# Patient Record
Sex: MALE | Race: WHITE | NOT HISPANIC OR LATINO | Employment: UNEMPLOYED | URBAN - NONMETROPOLITAN AREA
[De-identification: names, ages, dates, MRNs, and addresses within clinical notes are randomized per-mention and may not be internally consistent; named-entity substitution may affect disease eponyms.]

---

## 2017-07-12 ENCOUNTER — APPOINTMENT (OUTPATIENT)
Dept: CT IMAGING | Facility: HOSPITAL | Age: 77
End: 2017-07-12

## 2017-07-12 ENCOUNTER — APPOINTMENT (OUTPATIENT)
Dept: GENERAL RADIOLOGY | Facility: HOSPITAL | Age: 77
End: 2017-07-12

## 2017-07-12 ENCOUNTER — HOSPITAL ENCOUNTER (INPATIENT)
Facility: HOSPITAL | Age: 77
LOS: 2 days | Discharge: ANOTHER HOSPITAL | End: 2017-07-14
Attending: EMERGENCY MEDICINE | Admitting: INTERNAL MEDICINE

## 2017-07-12 DIAGNOSIS — J18.9 PNEUMONIA OF BOTH LUNGS DUE TO INFECTIOUS ORGANISM, UNSPECIFIED PART OF LUNG: Primary | ICD-10-CM

## 2017-07-12 PROBLEM — M19.90 DJD (DEGENERATIVE JOINT DISEASE): Status: ACTIVE | Noted: 2017-07-12

## 2017-07-12 PROBLEM — I25.10 ASHD (ARTERIOSCLEROTIC HEART DISEASE): Status: ACTIVE | Noted: 2017-07-12

## 2017-07-12 PROBLEM — J44.9 COPD (CHRONIC OBSTRUCTIVE PULMONARY DISEASE) (HCC): Status: ACTIVE | Noted: 2017-07-12

## 2017-07-12 LAB
6-ACETYL MORPHINE: NEGATIVE
A-A DO2: 79.7 MMHG (ref 0–300)
ALBUMIN SERPL-MCNC: 4 G/DL (ref 3.4–4.8)
ALBUMIN/GLOB SERPL: 1.3 G/DL (ref 1.5–2.5)
ALP SERPL-CCNC: 144 U/L (ref 40–129)
ALT SERPL W P-5'-P-CCNC: 19 U/L (ref 10–44)
AMPHET+METHAMPHET UR QL: NEGATIVE
AMYLASE SERPL-CCNC: 46 U/L (ref 28–100)
ANION GAP SERPL CALCULATED.3IONS-SCNC: 9 MMOL/L (ref 3.6–11.2)
ANISOCYTOSIS BLD QL: ABNORMAL
ARTERIAL PATENCY WRIST A: POSITIVE
AST SERPL-CCNC: 40 U/L (ref 10–34)
ATMOSPHERIC PRESS: 729 MMHG
BACTERIA UR QL AUTO: ABNORMAL /HPF
BARBITURATES UR QL SCN: NEGATIVE
BASE EXCESS BLDA CALC-SCNC: -2 MMOL/L
BDY SITE: ABNORMAL
BENZODIAZ UR QL SCN: NEGATIVE
BILIRUB SERPL-MCNC: 1.5 MG/DL (ref 0.2–1.8)
BILIRUB UR QL STRIP: ABNORMAL
BNP SERPL-MCNC: 375 PG/ML (ref 0–100)
BODY TEMPERATURE: 98.6 C
BUN BLD-MCNC: 24 MG/DL (ref 7–21)
BUN/CREAT SERPL: 13.4 (ref 7–25)
BUPRENORPHINE SERPL-MCNC: NEGATIVE NG/ML
CALCIUM SPEC-SCNC: 8.8 MG/DL (ref 7.7–10)
CANNABINOIDS SERPL QL: NEGATIVE
CHLORIDE SERPL-SCNC: 104 MMOL/L (ref 99–112)
CK MB SERPL-CCNC: 0.55 NG/ML (ref 0–5)
CK MB SERPL-RTO: 1.2 % (ref 0–3)
CK SERPL-CCNC: 46 U/L (ref 24–204)
CLARITY UR: ABNORMAL
CO2 SERPL-SCNC: 22 MMOL/L (ref 24.3–31.9)
COCAINE UR QL: NEGATIVE
COHGB MFR BLD: 2.2 % (ref 0–5)
COLOR UR: ABNORMAL
CREAT BLD-MCNC: 1.79 MG/DL (ref 0.43–1.29)
CYTOLOGIST CVX/VAG CYTO: NORMAL
DEPRECATED RDW RBC AUTO: 50.4 FL (ref 37–54)
ERYTHROCYTE [DISTWIDTH] IN BLOOD BY AUTOMATED COUNT: 15.3 % (ref 11.5–14.5)
GFR SERPL CREATININE-BSD FRML MDRD: 37 ML/MIN/1.73
GLOBULIN UR ELPH-MCNC: 3 GM/DL
GLUCOSE BLD-MCNC: 136 MG/DL (ref 70–110)
GLUCOSE BLDC GLUCOMTR-MCNC: 107 MG/DL (ref 70–130)
GLUCOSE BLDC GLUCOMTR-MCNC: 108 MG/DL (ref 70–130)
GLUCOSE BLDC GLUCOMTR-MCNC: 128 MG/DL (ref 70–130)
GLUCOSE BLDC GLUCOMTR-MCNC: 152 MG/DL (ref 70–130)
GLUCOSE UR STRIP-MCNC: NEGATIVE MG/DL
GRAN CASTS URNS QL MICRO: ABNORMAL /LPF
HCO3 BLDA-SCNC: 20.7 MMOL/L (ref 22–26)
HCT VFR BLD AUTO: 37.6 % (ref 42–52)
HCT VFR BLD CALC: 39 % (ref 42–52)
HGB BLD-MCNC: 13 G/DL (ref 14–18)
HGB BLDA-MCNC: 13.3 G/DL (ref 12–16)
HGB UR QL STRIP.AUTO: NEGATIVE
HOROWITZ INDEX BLD+IHG-RTO: 28 %
HYALINE CASTS UR QL AUTO: ABNORMAL /LPF
KETONES UR QL STRIP: ABNORMAL
L PNEUMO1 AG UR QL IA: NEGATIVE
LDH SERPL-CCNC: 544 U/L (ref 135–225)
LEUKOCYTE ESTERASE UR QL STRIP.AUTO: ABNORMAL
LIPASE SERPL-CCNC: 31 U/L (ref 13–60)
LYMPHOCYTES # BLD MANUAL: 3.6 10*3/MM3 (ref 1–3)
LYMPHOCYTES NFR BLD MANUAL: 10 % (ref 16–46)
LYMPHOCYTES NFR BLD MANUAL: 44 % (ref 0–12)
M PNEUMO IGM SER QL: NEGATIVE
MCH RBC QN AUTO: 33.2 PG (ref 27–33)
MCHC RBC AUTO-ENTMCNC: 34.6 G/DL (ref 33–37)
MCV RBC AUTO: 96.2 FL (ref 80–94)
METAMYELOCYTES NFR BLD MANUAL: 5 % (ref 0–0)
METHADONE UR QL SCN: NEGATIVE
METHGB BLD QL: 0.3 % (ref 0–3)
MODALITY: ABNORMAL
MONOCYTES # BLD AUTO: 15.82 10*3/MM3 (ref 0.1–0.9)
MYELOCYTES NFR BLD MANUAL: 3 % (ref 0–0)
MYOGLOBIN SERPL-MCNC: 81 NG/ML (ref 0–109)
NEUTROPHILS # BLD AUTO: 13.66 10*3/MM3 (ref 1.4–6.5)
NEUTROPHILS NFR BLD MANUAL: 38 % (ref 40–75)
NEUTS BAND NFR BLD MANUAL: ABNORMAL % (ref 0–8)
NITRITE UR QL STRIP: NEGATIVE
OPIATES UR QL: NEGATIVE
OSMOLALITY SERPL CALC.SUM OF ELEC: 276.2 MOSM/KG (ref 273–305)
OXYCODONE UR QL SCN: NEGATIVE
OXYHGB MFR BLDV: 93.2 % (ref 85–100)
PATH INTERP BLD-IMP: NORMAL
PCO2 BLDA: 29.8 MM HG (ref 35–45)
PCP UR QL SCN: NEGATIVE
PH BLDA: 7.46 PH UNITS (ref 7.35–7.45)
PH UR STRIP.AUTO: <=5 [PH] (ref 5–8)
PLATELET # BLD AUTO: 70 10*3/MM3 (ref 130–400)
PMV BLD AUTO: 13.3 FL (ref 6–10)
PO2 BLDA: 76.1 MM HG (ref 80–100)
POTASSIUM BLD-SCNC: 4 MMOL/L (ref 3.5–5.3)
PROMYELOCYTES NFR BLD MANUAL: ABNORMAL % (ref 0–0)
PROT SERPL-MCNC: 7 G/DL (ref 6–8)
PROT UR QL STRIP: ABNORMAL
RBC # BLD AUTO: 3.91 10*6/MM3 (ref 4.7–6.1)
RBC # UR: ABNORMAL /HPF
REF LAB TEST METHOD: ABNORMAL
SAO2 % BLDCOA: 95.6 % (ref 90–100)
SCAN SLIDE: NORMAL
SMALL PLATELETS BLD QL SMEAR: ABNORMAL
SODIUM BLD-SCNC: 135 MMOL/L (ref 135–153)
SP GR UR STRIP: >=1.03 (ref 1–1.03)
SQUAMOUS #/AREA URNS HPF: ABNORMAL /HPF
TROPONIN I SERPL-MCNC: <0.006 NG/ML
TROPONIN I SERPL-MCNC: <0.006 NG/ML
URATE SERPL-MCNC: 7.6 MG/DL (ref 3.7–7)
UROBILINOGEN UR QL STRIP: ABNORMAL
WBC NRBC COR # BLD: 35.95 10*3/MM3 (ref 4.5–12.5)
WBC UR QL AUTO: ABNORMAL /HPF

## 2017-07-12 PROCEDURE — 87205 SMEAR GRAM STAIN: CPT | Performed by: INTERNAL MEDICINE

## 2017-07-12 PROCEDURE — 82805 BLOOD GASES W/O2 SATURATION: CPT | Performed by: PHYSICIAN ASSISTANT

## 2017-07-12 PROCEDURE — 25010000002 VANCOMYCIN PER 500 MG

## 2017-07-12 PROCEDURE — 70450 CT HEAD/BRAIN W/O DYE: CPT | Performed by: RADIOLOGY

## 2017-07-12 PROCEDURE — 71250 CT THORAX DX C-: CPT

## 2017-07-12 PROCEDURE — 80307 DRUG TEST PRSMV CHEM ANLYZR: CPT | Performed by: PHYSICIAN ASSISTANT

## 2017-07-12 PROCEDURE — 25010000002 ONDANSETRON PER 1 MG: Performed by: PHYSICIAN ASSISTANT

## 2017-07-12 PROCEDURE — 83615 LACTATE (LD) (LDH) ENZYME: CPT | Performed by: INTERNAL MEDICINE

## 2017-07-12 PROCEDURE — 94799 UNLISTED PULMONARY SVC/PX: CPT

## 2017-07-12 PROCEDURE — 36600 WITHDRAWAL OF ARTERIAL BLOOD: CPT | Performed by: PHYSICIAN ASSISTANT

## 2017-07-12 PROCEDURE — 25010000002 AZITHROMYCIN: Performed by: PHYSICIAN ASSISTANT

## 2017-07-12 PROCEDURE — 94640 AIRWAY INHALATION TREATMENT: CPT

## 2017-07-12 PROCEDURE — 80053 COMPREHEN METABOLIC PANEL: CPT | Performed by: PHYSICIAN ASSISTANT

## 2017-07-12 PROCEDURE — 84484 ASSAY OF TROPONIN QUANT: CPT | Performed by: PHYSICIAN ASSISTANT

## 2017-07-12 PROCEDURE — 83690 ASSAY OF LIPASE: CPT | Performed by: PHYSICIAN ASSISTANT

## 2017-07-12 PROCEDURE — 82550 ASSAY OF CK (CPK): CPT | Performed by: PHYSICIAN ASSISTANT

## 2017-07-12 PROCEDURE — 63710000001 INSULIN ASPART PER 5 UNITS: Performed by: INTERNAL MEDICINE

## 2017-07-12 PROCEDURE — 85060 BLOOD SMEAR INTERPRETATION: CPT | Performed by: PHYSICIAN ASSISTANT

## 2017-07-12 PROCEDURE — 83050 HGB METHEMOGLOBIN QUAN: CPT | Performed by: PHYSICIAN ASSISTANT

## 2017-07-12 PROCEDURE — 87040 BLOOD CULTURE FOR BACTERIA: CPT | Performed by: PHYSICIAN ASSISTANT

## 2017-07-12 PROCEDURE — 85025 COMPLETE CBC W/AUTO DIFF WBC: CPT | Performed by: PHYSICIAN ASSISTANT

## 2017-07-12 PROCEDURE — 71010 HC CHEST AP: CPT

## 2017-07-12 PROCEDURE — 84550 ASSAY OF BLOOD/URIC ACID: CPT | Performed by: INTERNAL MEDICINE

## 2017-07-12 PROCEDURE — 85060 BLOOD SMEAR INTERPRETATION: CPT | Performed by: INTERNAL MEDICINE

## 2017-07-12 PROCEDURE — 93010 ELECTROCARDIOGRAM REPORT: CPT | Performed by: INTERNAL MEDICINE

## 2017-07-12 PROCEDURE — 87070 CULTURE OTHR SPECIMN AEROBIC: CPT | Performed by: INTERNAL MEDICINE

## 2017-07-12 PROCEDURE — 87040 BLOOD CULTURE FOR BACTERIA: CPT | Performed by: INTERNAL MEDICINE

## 2017-07-12 PROCEDURE — 99285 EMERGENCY DEPT VISIT HI MDM: CPT

## 2017-07-12 PROCEDURE — 99223 1ST HOSP IP/OBS HIGH 75: CPT | Performed by: INTERNAL MEDICINE

## 2017-07-12 PROCEDURE — 82962 GLUCOSE BLOOD TEST: CPT

## 2017-07-12 PROCEDURE — 81001 URINALYSIS AUTO W/SCOPE: CPT | Performed by: PHYSICIAN ASSISTANT

## 2017-07-12 PROCEDURE — 82150 ASSAY OF AMYLASE: CPT | Performed by: PHYSICIAN ASSISTANT

## 2017-07-12 PROCEDURE — 87449 NOS EACH ORGANISM AG IA: CPT | Performed by: INTERNAL MEDICINE

## 2017-07-12 PROCEDURE — 86757 RICKETTSIA ANTIBODY: CPT | Performed by: INTERNAL MEDICINE

## 2017-07-12 PROCEDURE — 93005 ELECTROCARDIOGRAM TRACING: CPT | Performed by: PHYSICIAN ASSISTANT

## 2017-07-12 PROCEDURE — 93005 ELECTROCARDIOGRAM TRACING: CPT | Performed by: INTERNAL MEDICINE

## 2017-07-12 PROCEDURE — 36415 COLL VENOUS BLD VENIPUNCTURE: CPT

## 2017-07-12 PROCEDURE — 71250 CT THORAX DX C-: CPT | Performed by: RADIOLOGY

## 2017-07-12 PROCEDURE — 87086 URINE CULTURE/COLONY COUNT: CPT | Performed by: PHYSICIAN ASSISTANT

## 2017-07-12 PROCEDURE — 85007 BL SMEAR W/DIFF WBC COUNT: CPT | Performed by: PHYSICIAN ASSISTANT

## 2017-07-12 PROCEDURE — 83880 ASSAY OF NATRIURETIC PEPTIDE: CPT | Performed by: PHYSICIAN ASSISTANT

## 2017-07-12 PROCEDURE — 70450 CT HEAD/BRAIN W/O DYE: CPT

## 2017-07-12 PROCEDURE — 83874 ASSAY OF MYOGLOBIN: CPT | Performed by: PHYSICIAN ASSISTANT

## 2017-07-12 PROCEDURE — 71010 XR CHEST AP: CPT | Performed by: RADIOLOGY

## 2017-07-12 PROCEDURE — 82553 CREATINE MB FRACTION: CPT | Performed by: PHYSICIAN ASSISTANT

## 2017-07-12 PROCEDURE — 25010000002 PIPERACILLIN-TAZOBACTAM: Performed by: INTERNAL MEDICINE

## 2017-07-12 PROCEDURE — 82375 ASSAY CARBOXYHB QUANT: CPT | Performed by: PHYSICIAN ASSISTANT

## 2017-07-12 PROCEDURE — 25010000002 ENOXAPARIN PER 10 MG: Performed by: INTERNAL MEDICINE

## 2017-07-12 RX ORDER — ATORVASTATIN CALCIUM 10 MG/1
10 TABLET, FILM COATED ORAL DAILY
COMMUNITY

## 2017-07-12 RX ORDER — FAMOTIDINE 20 MG/1
20 TABLET, FILM COATED ORAL DAILY
Status: DISCONTINUED | OUTPATIENT
Start: 2017-07-12 | End: 2017-07-14 | Stop reason: HOSPADM

## 2017-07-12 RX ORDER — CEFPODOXIME PROXETIL 200 MG/1
200 TABLET, FILM COATED ORAL EVERY 12 HOURS
COMMUNITY
End: 2017-07-12

## 2017-07-12 RX ORDER — CLOPIDOGREL BISULFATE 75 MG/1
75 TABLET ORAL DAILY
Status: CANCELLED | OUTPATIENT
Start: 2017-07-12

## 2017-07-12 RX ORDER — FLUOXETINE HYDROCHLORIDE 20 MG/1
20 CAPSULE ORAL DAILY
COMMUNITY

## 2017-07-12 RX ORDER — CARVEDILOL 6.25 MG/1
12.5 TABLET ORAL 2 TIMES DAILY WITH MEALS
Status: CANCELLED | OUTPATIENT
Start: 2017-07-12

## 2017-07-12 RX ORDER — SODIUM CHLORIDE 450 MG/100ML
75 INJECTION, SOLUTION INTRAVENOUS CONTINUOUS
Status: DISCONTINUED | OUTPATIENT
Start: 2017-07-12 | End: 2017-07-14

## 2017-07-12 RX ORDER — FLUOXETINE HYDROCHLORIDE 20 MG/1
20 CAPSULE ORAL DAILY
Status: DISCONTINUED | OUTPATIENT
Start: 2017-07-12 | End: 2017-07-14 | Stop reason: HOSPADM

## 2017-07-12 RX ORDER — ONDANSETRON 2 MG/ML
4 INJECTION INTRAMUSCULAR; INTRAVENOUS EVERY 6 HOURS PRN
Status: DISCONTINUED | OUTPATIENT
Start: 2017-07-12 | End: 2017-07-14 | Stop reason: HOSPADM

## 2017-07-12 RX ORDER — IPRATROPIUM BROMIDE AND ALBUTEROL SULFATE 2.5; .5 MG/3ML; MG/3ML
3 SOLUTION RESPIRATORY (INHALATION)
Status: DISCONTINUED | OUTPATIENT
Start: 2017-07-12 | End: 2017-07-14 | Stop reason: HOSPADM

## 2017-07-12 RX ORDER — ASPIRIN 81 MG/1
81 TABLET, CHEWABLE ORAL DAILY
Status: DISCONTINUED | OUTPATIENT
Start: 2017-07-12 | End: 2017-07-14

## 2017-07-12 RX ORDER — ALLOPURINOL 300 MG/1
300 TABLET ORAL 2 TIMES DAILY
Status: DISCONTINUED | OUTPATIENT
Start: 2017-07-12 | End: 2017-07-14 | Stop reason: HOSPADM

## 2017-07-12 RX ORDER — LORAZEPAM 0.5 MG/1
0.5 TABLET ORAL ONCE
Status: COMPLETED | OUTPATIENT
Start: 2017-07-13 | End: 2017-07-13

## 2017-07-12 RX ORDER — ASPIRIN 81 MG/1
81 TABLET, CHEWABLE ORAL NIGHTLY
COMMUNITY
End: 2017-07-14 | Stop reason: HOSPADM

## 2017-07-12 RX ORDER — FAMOTIDINE 20 MG/1
20 TABLET, FILM COATED ORAL DAILY
Status: CANCELLED | OUTPATIENT
Start: 2017-07-12

## 2017-07-12 RX ORDER — IPRATROPIUM BROMIDE AND ALBUTEROL SULFATE 2.5; .5 MG/3ML; MG/3ML
3 SOLUTION RESPIRATORY (INHALATION)
Status: DISCONTINUED | OUTPATIENT
Start: 2017-07-12 | End: 2017-07-12

## 2017-07-12 RX ORDER — ATORVASTATIN CALCIUM 10 MG/1
10 TABLET, FILM COATED ORAL NIGHTLY
Status: DISCONTINUED | OUTPATIENT
Start: 2017-07-12 | End: 2017-07-14 | Stop reason: HOSPADM

## 2017-07-12 RX ORDER — IPRATROPIUM BROMIDE AND ALBUTEROL SULFATE 2.5; .5 MG/3ML; MG/3ML
3 SOLUTION RESPIRATORY (INHALATION) EVERY 4 HOURS PRN
COMMUNITY

## 2017-07-12 RX ORDER — FLUOXETINE HYDROCHLORIDE 20 MG/1
20 CAPSULE ORAL DAILY
Status: CANCELLED | OUTPATIENT
Start: 2017-07-12

## 2017-07-12 RX ORDER — ATORVASTATIN CALCIUM 10 MG/1
10 TABLET, FILM COATED ORAL DAILY
Status: CANCELLED | OUTPATIENT
Start: 2017-07-12

## 2017-07-12 RX ORDER — ONDANSETRON 2 MG/ML
4 INJECTION INTRAMUSCULAR; INTRAVENOUS ONCE
Status: COMPLETED | OUTPATIENT
Start: 2017-07-12 | End: 2017-07-12

## 2017-07-12 RX ORDER — UREA 10 %
1 LOTION (ML) TOPICAL DAILY
Status: DISCONTINUED | OUTPATIENT
Start: 2017-07-12 | End: 2017-07-14 | Stop reason: HOSPADM

## 2017-07-12 RX ORDER — SODIUM CHLORIDE 0.9 % (FLUSH) 0.9 %
10 SYRINGE (ML) INJECTION AS NEEDED
Status: DISCONTINUED | OUTPATIENT
Start: 2017-07-12 | End: 2017-07-14 | Stop reason: HOSPADM

## 2017-07-12 RX ORDER — ASPIRIN 81 MG/1
81 TABLET, CHEWABLE ORAL NIGHTLY
Status: CANCELLED | OUTPATIENT
Start: 2017-07-12

## 2017-07-12 RX ORDER — DEXTROSE MONOHYDRATE 25 G/50ML
25 INJECTION, SOLUTION INTRAVENOUS
Status: DISCONTINUED | OUTPATIENT
Start: 2017-07-12 | End: 2017-07-14 | Stop reason: HOSPADM

## 2017-07-12 RX ORDER — NICOTINE POLACRILEX 4 MG
15 LOZENGE BUCCAL
Status: DISCONTINUED | OUTPATIENT
Start: 2017-07-12 | End: 2017-07-14 | Stop reason: HOSPADM

## 2017-07-12 RX ORDER — CARVEDILOL 25 MG/1
12.5 TABLET ORAL 2 TIMES DAILY WITH MEALS
COMMUNITY
End: 2017-07-14 | Stop reason: HOSPADM

## 2017-07-12 RX ORDER — CLOPIDOGREL BISULFATE 75 MG/1
75 TABLET ORAL DAILY
Status: DISCONTINUED | OUTPATIENT
Start: 2017-07-12 | End: 2017-07-14

## 2017-07-12 RX ORDER — IPRATROPIUM BROMIDE AND ALBUTEROL SULFATE 2.5; .5 MG/3ML; MG/3ML
3 SOLUTION RESPIRATORY (INHALATION) EVERY 4 HOURS PRN
Status: DISCONTINUED | OUTPATIENT
Start: 2017-07-12 | End: 2017-07-14 | Stop reason: HOSPADM

## 2017-07-12 RX ORDER — CLOPIDOGREL BISULFATE 75 MG/1
75 TABLET ORAL DAILY
COMMUNITY
End: 2017-07-14 | Stop reason: HOSPADM

## 2017-07-12 RX ORDER — LORAZEPAM 0.5 MG/1
0.5 TABLET ORAL ONCE
Status: COMPLETED | OUTPATIENT
Start: 2017-07-12 | End: 2017-07-12

## 2017-07-12 RX ORDER — RANITIDINE 150 MG/1
150 TABLET ORAL DAILY
COMMUNITY
End: 2017-07-14 | Stop reason: HOSPADM

## 2017-07-12 RX ORDER — FAMOTIDINE 20 MG/1
20 TABLET, FILM COATED ORAL 2 TIMES DAILY
Status: DISCONTINUED | OUTPATIENT
Start: 2017-07-12 | End: 2017-07-12

## 2017-07-12 RX ORDER — CARVEDILOL 6.25 MG/1
12.5 TABLET ORAL EVERY 12 HOURS SCHEDULED
Status: DISCONTINUED | OUTPATIENT
Start: 2017-07-12 | End: 2017-07-14 | Stop reason: HOSPADM

## 2017-07-12 RX ORDER — ACETAMINOPHEN 325 MG/1
650 TABLET ORAL EVERY 6 HOURS PRN
Status: DISCONTINUED | OUTPATIENT
Start: 2017-07-12 | End: 2017-07-14 | Stop reason: HOSPADM

## 2017-07-12 RX ADMIN — PIPERACILLIN SODIUM,TAZOBACTAM SODIUM 3.38 G: 3; .375 INJECTION, POWDER, FOR SOLUTION INTRAVENOUS at 08:26

## 2017-07-12 RX ADMIN — FLUOXETINE 20 MG: 20 CAPSULE ORAL at 08:27

## 2017-07-12 RX ADMIN — VANCOMYCIN HYDROCHLORIDE 1750 MG: 5 INJECTION, POWDER, LYOPHILIZED, FOR SOLUTION INTRAVENOUS at 11:38

## 2017-07-12 RX ADMIN — Medication 1 TABLET: at 08:27

## 2017-07-12 RX ADMIN — ACETAMINOPHEN 650 MG: 325 TABLET ORAL at 19:48

## 2017-07-12 RX ADMIN — PIPERACILLIN SODIUM,TAZOBACTAM SODIUM 3.38 G: 3; .375 INJECTION, POWDER, FOR SOLUTION INTRAVENOUS at 17:03

## 2017-07-12 RX ADMIN — ENOXAPARIN SODIUM 30 MG: 30 INJECTION SUBCUTANEOUS at 08:26

## 2017-07-12 RX ADMIN — FAMOTIDINE 20 MG: 20 TABLET ORAL at 08:27

## 2017-07-12 RX ADMIN — INSULIN ASPART 2 UNITS: 100 INJECTION, SOLUTION INTRAVENOUS; SUBCUTANEOUS at 11:38

## 2017-07-12 RX ADMIN — AZITHROMYCIN 500 MG: 500 INJECTION, POWDER, LYOPHILIZED, FOR SOLUTION INTRAVENOUS at 05:10

## 2017-07-12 RX ADMIN — ASPIRIN 81 MG: 81 TABLET, CHEWABLE ORAL at 08:26

## 2017-07-12 RX ADMIN — CLOPIDOGREL BISULFATE 75 MG: 75 TABLET, FILM COATED ORAL at 08:26

## 2017-07-12 RX ADMIN — CARVEDILOL 12.5 MG: 6.25 TABLET, FILM COATED ORAL at 19:48

## 2017-07-12 RX ADMIN — SODIUM CHLORIDE 75 ML/HR: 4.5 INJECTION, SOLUTION INTRAVENOUS at 08:26

## 2017-07-12 RX ADMIN — SODIUM CHLORIDE 500 ML: 9 INJECTION, SOLUTION INTRAVENOUS at 04:12

## 2017-07-12 RX ADMIN — LORAZEPAM 0.5 MG: 0.5 TABLET ORAL at 17:03

## 2017-07-12 RX ADMIN — ONDANSETRON 4 MG: 2 INJECTION, SOLUTION INTRAMUSCULAR; INTRAVENOUS at 05:41

## 2017-07-12 RX ADMIN — ATORVASTATIN CALCIUM 10 MG: 10 TABLET, FILM COATED ORAL at 19:48

## 2017-07-12 RX ADMIN — PIPERACILLIN SODIUM,TAZOBACTAM SODIUM 3.38 G: 3; .375 INJECTION, POWDER, FOR SOLUTION INTRAVENOUS at 19:49

## 2017-07-12 RX ADMIN — IPRATROPIUM BROMIDE AND ALBUTEROL SULFATE 3 ML: .5; 3 SOLUTION RESPIRATORY (INHALATION) at 14:18

## 2017-07-12 RX ADMIN — ALLOPURINOL 300 MG: 300 TABLET ORAL at 17:19

## 2017-07-12 RX ADMIN — IPRATROPIUM BROMIDE AND ALBUTEROL SULFATE 3 ML: .5; 3 SOLUTION RESPIRATORY (INHALATION) at 18:57

## 2017-07-13 ENCOUNTER — APPOINTMENT (OUTPATIENT)
Dept: GENERAL RADIOLOGY | Facility: HOSPITAL | Age: 77
End: 2017-07-13

## 2017-07-13 ENCOUNTER — APPOINTMENT (OUTPATIENT)
Dept: CT IMAGING | Facility: HOSPITAL | Age: 77
End: 2017-07-13

## 2017-07-13 PROBLEM — D72.821 MONOCYTOSIS: Status: ACTIVE | Noted: 2017-07-13

## 2017-07-13 PROBLEM — D69.6 THROMBOCYTOPENIA (HCC): Status: ACTIVE | Noted: 2017-07-13

## 2017-07-13 PROBLEM — D64.9 NORMOCYTIC ANEMIA: Status: ACTIVE | Noted: 2017-07-13

## 2017-07-13 LAB
A-A DO2: 169.5 MMHG (ref 0–300)
A-A DO2: 219.3 MMHG (ref 0–300)
ALBUMIN SERPL-MCNC: 3.3 G/DL (ref 3.4–4.8)
ALBUMIN/GLOB SERPL: 1.3 G/DL (ref 1.5–2.5)
ALP SERPL-CCNC: 112 U/L (ref 40–129)
ALT SERPL W P-5'-P-CCNC: 13 U/L (ref 10–44)
ANION GAP SERPL CALCULATED.3IONS-SCNC: 9.6 MMOL/L (ref 3.6–11.2)
ANISOCYTOSIS BLD QL: ABNORMAL
ANISOCYTOSIS BLD QL: ABNORMAL
ARTERIAL PATENCY WRIST A: POSITIVE
ARTERIAL PATENCY WRIST A: POSITIVE
AST SERPL-CCNC: 32 U/L (ref 10–34)
ATMOSPHERIC PRESS: 731 MMHG
ATMOSPHERIC PRESS: 731 MMHG
BASE EXCESS BLDA CALC-SCNC: -3.8 MMOL/L
BASE EXCESS BLDA CALC-SCNC: -4.6 MMOL/L
BDY SITE: ABNORMAL
BDY SITE: ABNORMAL
BILIRUB SERPL-MCNC: 1.9 MG/DL (ref 0.2–1.8)
BODY TEMPERATURE: 98.6 C
BODY TEMPERATURE: 98.6 C
BUN BLD-MCNC: 26 MG/DL (ref 7–21)
BUN/CREAT SERPL: 16.3 (ref 7–25)
CALCIUM SPEC-SCNC: 8.4 MG/DL (ref 7.7–10)
CHLORIDE SERPL-SCNC: 106 MMOL/L (ref 99–112)
CO2 SERPL-SCNC: 21.4 MMOL/L (ref 24.3–31.9)
COHGB MFR BLD: 1.6 % (ref 0–5)
COHGB MFR BLD: 2.2 % (ref 0–5)
CREAT BLD-MCNC: 1.6 MG/DL (ref 0.43–1.29)
DEPRECATED RDW RBC AUTO: 52.1 FL (ref 37–54)
DEPRECATED RDW RBC AUTO: 52.8 FL (ref 37–54)
EPAP: 7
ERYTHROCYTE [DISTWIDTH] IN BLOOD BY AUTOMATED COUNT: 15.5 % (ref 11.5–14.5)
ERYTHROCYTE [DISTWIDTH] IN BLOOD BY AUTOMATED COUNT: 15.9 % (ref 11.5–14.5)
FERRITIN SERPL-MCNC: 1018 NG/ML (ref 21.9–321.7)
FOLATE SERPL-MCNC: >24 NG/ML (ref 5.4–20)
GFR SERPL CREATININE-BSD FRML MDRD: 42 ML/MIN/1.73
GLOBULIN UR ELPH-MCNC: 2.6 GM/DL
GLUCOSE BLD-MCNC: 126 MG/DL (ref 70–110)
GLUCOSE BLDC GLUCOMTR-MCNC: 103 MG/DL (ref 70–130)
GLUCOSE BLDC GLUCOMTR-MCNC: 110 MG/DL (ref 70–130)
GLUCOSE BLDC GLUCOMTR-MCNC: 116 MG/DL (ref 70–130)
GLUCOSE BLDC GLUCOMTR-MCNC: 99 MG/DL (ref 70–130)
HAV IGM SERPL QL IA: NORMAL
HBV CORE IGM SERPL QL IA: NORMAL
HBV SURFACE AG SERPL QL IA: NORMAL
HCO3 BLDA-SCNC: 18.2 MMOL/L (ref 22–26)
HCO3 BLDA-SCNC: 19.2 MMOL/L (ref 22–26)
HCT VFR BLD AUTO: 30.3 % (ref 42–52)
HCT VFR BLD AUTO: 32.3 % (ref 42–52)
HCT VFR BLD CALC: 34 % (ref 42–52)
HCT VFR BLD CALC: 36 % (ref 42–52)
HCV AB SER DONR QL: NORMAL
HGB BLD-MCNC: 10.4 G/DL (ref 14–18)
HGB BLD-MCNC: 10.9 G/DL (ref 14–18)
HGB BLDA-MCNC: 11.6 G/DL (ref 12–16)
HGB BLDA-MCNC: 12.2 G/DL (ref 12–16)
HIV1+2 AB SER QL: NORMAL
HOROWITZ INDEX BLD+IHG-RTO: 45 %
HOROWITZ INDEX BLD+IHG-RTO: 50 %
IPAP: 14
IRON 24H UR-MRATE: 37 MCG/DL (ref 53–167)
IRON SATN MFR SERPL: 17 % (ref 20–50)
LDH SERPL-CCNC: 492 U/L (ref 135–225)
LYMPHOCYTES # BLD MANUAL: 3.01 10*3/MM3 (ref 1–3)
LYMPHOCYTES # BLD MANUAL: 3.82 10*3/MM3 (ref 1–3)
LYMPHOCYTES NFR BLD MANUAL: 13 % (ref 16–46)
LYMPHOCYTES NFR BLD MANUAL: 46 % (ref 0–12)
LYMPHOCYTES NFR BLD MANUAL: 50 % (ref 0–12)
LYMPHOCYTES NFR BLD MANUAL: 8 % (ref 16–46)
MCH RBC QN AUTO: 32.3 PG (ref 27–33)
MCH RBC QN AUTO: 32.4 PG (ref 27–33)
MCHC RBC AUTO-ENTMCNC: 33.7 G/DL (ref 33–37)
MCHC RBC AUTO-ENTMCNC: 34.3 G/DL (ref 33–37)
MCV RBC AUTO: 94.1 FL (ref 80–94)
MCV RBC AUTO: 96.1 FL (ref 80–94)
METAMYELOCYTES NFR BLD MANUAL: 1 % (ref 0–0)
METAMYELOCYTES NFR BLD MANUAL: 3 % (ref 0–0)
METHGB BLD QL: 0.4 % (ref 0–3)
METHGB BLD QL: 0.4 % (ref 0–3)
MODALITY: ABNORMAL
MODALITY: ABNORMAL
MONOCYTES # BLD AUTO: 13.51 10*3/MM3 (ref 0.1–0.9)
MONOCYTES # BLD AUTO: 18.83 10*3/MM3 (ref 0.1–0.9)
MYELOCYTES NFR BLD MANUAL: 2 % (ref 0–0)
MYELOCYTES NFR BLD MANUAL: 2 % (ref 0–0)
NEUTROPHILS # BLD AUTO: 11.16 10*3/MM3 (ref 1.4–6.5)
NEUTROPHILS # BLD AUTO: 13.93 10*3/MM3 (ref 1.4–6.5)
NEUTROPHILS NFR BLD MANUAL: 37 % (ref 40–75)
NEUTROPHILS NFR BLD MANUAL: 38 % (ref 40–75)
OSMOLALITY SERPL CALC.SUM OF ELEC: 280.1 MOSM/KG (ref 273–305)
OXYHGB MFR BLDV: 95.2 % (ref 85–100)
OXYHGB MFR BLDV: 95.9 % (ref 85–100)
PCO2 BLDA: 27.2 MM HG (ref 35–45)
PCO2 BLDA: 28.6 MM HG (ref 35–45)
PH BLDA: 7.44 PH UNITS (ref 7.35–7.45)
PH BLDA: 7.44 PH UNITS (ref 7.35–7.45)
PLAT MORPH BLD: NORMAL
PLATELET # BLD AUTO: 48 10*3/MM3 (ref 130–400)
PLATELET # BLD AUTO: 50 10*3/MM3 (ref 130–400)
PMV BLD AUTO: 13.2 FL (ref 6–10)
PMV BLD AUTO: ABNORMAL FL (ref 6–10)
PO2 BLDA: 107.4 MM HG (ref 80–100)
PO2 BLDA: 90.5 MM HG (ref 80–100)
POTASSIUM BLD-SCNC: 3.8 MMOL/L (ref 3.5–5.3)
PROT SERPL-MCNC: 5.9 G/DL (ref 6–8)
RBC # BLD AUTO: 3.22 10*6/MM3 (ref 4.7–6.1)
RBC # BLD AUTO: 3.36 10*6/MM3 (ref 4.7–6.1)
RETICS #: 0.08 10*6/MM3 (ref 0.02–0.13)
RETICS/RBC NFR AUTO: 2.38 % (ref 0.5–2)
SAO2 % BLDCOA: 97.1 % (ref 90–100)
SAO2 % BLDCOA: 98.5 % (ref 90–100)
SCAN SLIDE: NORMAL
SET MECH RESP RATE: 16
SMALL PLATELETS BLD QL SMEAR: ABNORMAL
SODIUM BLD-SCNC: 137 MMOL/L (ref 135–153)
TIBC SERPL-MCNC: 219 MCG/DL (ref 241–421)
URATE SERPL-MCNC: 7 MG/DL (ref 3.7–7)
VIT B12 BLD-MCNC: 1282 PG/ML (ref 211–911)
WBC NRBC COR # BLD: 29.37 10*3/MM3 (ref 4.5–12.5)
WBC NRBC COR # BLD: 37.65 10*3/MM3 (ref 4.5–12.5)

## 2017-07-13 PROCEDURE — 87205 SMEAR GRAM STAIN: CPT | Performed by: INTERNAL MEDICINE

## 2017-07-13 PROCEDURE — 81206 BCR/ABL1 GENE MAJOR BP: CPT | Performed by: INTERNAL MEDICINE

## 2017-07-13 PROCEDURE — 82805 BLOOD GASES W/O2 SATURATION: CPT | Performed by: INTERNAL MEDICINE

## 2017-07-13 PROCEDURE — 83550 IRON BINDING TEST: CPT | Performed by: INTERNAL MEDICINE

## 2017-07-13 PROCEDURE — 82525 ASSAY OF COPPER: CPT | Performed by: INTERNAL MEDICINE

## 2017-07-13 PROCEDURE — 82746 ASSAY OF FOLIC ACID SERUM: CPT | Performed by: INTERNAL MEDICINE

## 2017-07-13 PROCEDURE — 80074 ACUTE HEPATITIS PANEL: CPT | Performed by: INTERNAL MEDICINE

## 2017-07-13 PROCEDURE — 38221 DX BONE MARROW BIOPSIES: CPT | Performed by: INTERNAL MEDICINE

## 2017-07-13 PROCEDURE — 85045 AUTOMATED RETICULOCYTE COUNT: CPT | Performed by: INTERNAL MEDICINE

## 2017-07-13 PROCEDURE — 94799 UNLISTED PULMONARY SVC/PX: CPT

## 2017-07-13 PROCEDURE — 80053 COMPREHEN METABOLIC PANEL: CPT | Performed by: INTERNAL MEDICINE

## 2017-07-13 PROCEDURE — 82607 VITAMIN B-12: CPT | Performed by: INTERNAL MEDICINE

## 2017-07-13 PROCEDURE — 5A1945Z RESPIRATORY VENTILATION, 24-96 CONSECUTIVE HOURS: ICD-10-PCS | Performed by: EMERGENCY MEDICINE

## 2017-07-13 PROCEDURE — 25010000002 VANCOMYCIN PER 500 MG

## 2017-07-13 PROCEDURE — 83540 ASSAY OF IRON: CPT | Performed by: INTERNAL MEDICINE

## 2017-07-13 PROCEDURE — 25010000002 ENOXAPARIN PER 10 MG: Performed by: INTERNAL MEDICINE

## 2017-07-13 PROCEDURE — 84550 ASSAY OF BLOOD/URIC ACID: CPT | Performed by: INTERNAL MEDICINE

## 2017-07-13 PROCEDURE — 83615 LACTATE (LD) (LDH) ENZYME: CPT | Performed by: INTERNAL MEDICINE

## 2017-07-13 PROCEDURE — 82962 GLUCOSE BLOOD TEST: CPT

## 2017-07-13 PROCEDURE — 36600 WITHDRAWAL OF ARTERIAL BLOOD: CPT | Performed by: INTERNAL MEDICINE

## 2017-07-13 PROCEDURE — 70450 CT HEAD/BRAIN W/O DYE: CPT | Performed by: RADIOLOGY

## 2017-07-13 PROCEDURE — 82375 ASSAY CARBOXYHB QUANT: CPT | Performed by: INTERNAL MEDICINE

## 2017-07-13 PROCEDURE — 71010 HC CHEST PA OR AP: CPT

## 2017-07-13 PROCEDURE — 25010000002 HALOPERIDOL LACTATE PER 5 MG: Performed by: INTERNAL MEDICINE

## 2017-07-13 PROCEDURE — 70450 CT HEAD/BRAIN W/O DYE: CPT

## 2017-07-13 PROCEDURE — 83050 HGB METHEMOGLOBIN QUAN: CPT | Performed by: INTERNAL MEDICINE

## 2017-07-13 PROCEDURE — 87040 BLOOD CULTURE FOR BACTERIA: CPT | Performed by: INTERNAL MEDICINE

## 2017-07-13 PROCEDURE — 31500 INSERT EMERGENCY AIRWAY: CPT | Performed by: EMERGENCY MEDICINE

## 2017-07-13 PROCEDURE — 85097 BONE MARROW INTERPRETATION: CPT

## 2017-07-13 PROCEDURE — 82668 ASSAY OF ERYTHROPOIETIN: CPT | Performed by: INTERNAL MEDICINE

## 2017-07-13 PROCEDURE — 25010000002 PIPERACILLIN-TAZOBACTAM: Performed by: INTERNAL MEDICINE

## 2017-07-13 PROCEDURE — 94002 VENT MGMT INPAT INIT DAY: CPT

## 2017-07-13 PROCEDURE — 88313 SPECIAL STAINS GROUP 2: CPT

## 2017-07-13 PROCEDURE — 25010000002 LORAZEPAM PER 2 MG

## 2017-07-13 PROCEDURE — 31500 INSERT EMERGENCY AIRWAY: CPT

## 2017-07-13 PROCEDURE — 0BH17EZ INSERTION OF ENDOTRACHEAL AIRWAY INTO TRACHEA, VIA NATURAL OR ARTIFICIAL OPENING: ICD-10-PCS | Performed by: EMERGENCY MEDICINE

## 2017-07-13 PROCEDURE — 71010 HC CHEST AP: CPT

## 2017-07-13 PROCEDURE — 88311 DECALCIFY TISSUE: CPT

## 2017-07-13 PROCEDURE — 88305 TISSUE EXAM BY PATHOLOGIST: CPT

## 2017-07-13 PROCEDURE — 82728 ASSAY OF FERRITIN: CPT | Performed by: INTERNAL MEDICINE

## 2017-07-13 PROCEDURE — G0432 EIA HIV-1/HIV-2 SCREEN: HCPCS | Performed by: INTERNAL MEDICINE

## 2017-07-13 PROCEDURE — 25010000002 FUROSEMIDE PER 20 MG

## 2017-07-13 PROCEDURE — 25010000002 LORAZEPAM PER 2 MG: Performed by: INTERNAL MEDICINE

## 2017-07-13 PROCEDURE — 87070 CULTURE OTHR SPECIMN AEROBIC: CPT | Performed by: INTERNAL MEDICINE

## 2017-07-13 PROCEDURE — 87086 URINE CULTURE/COLONY COUNT: CPT | Performed by: INTERNAL MEDICINE

## 2017-07-13 PROCEDURE — 88342 IMHCHEM/IMCYTCHM 1ST ANTB: CPT

## 2017-07-13 PROCEDURE — 94660 CPAP INITIATION&MGMT: CPT

## 2017-07-13 PROCEDURE — G0364 BONE MARROW ASPIRATE &BIOPSY: HCPCS | Performed by: INTERNAL MEDICINE

## 2017-07-13 PROCEDURE — 87186 SC STD MICRODIL/AGAR DIL: CPT | Performed by: INTERNAL MEDICINE

## 2017-07-13 PROCEDURE — 85025 COMPLETE CBC W/AUTO DIFF WBC: CPT | Performed by: INTERNAL MEDICINE

## 2017-07-13 PROCEDURE — 81207 BCR/ABL1 GENE MINOR BP: CPT | Performed by: INTERNAL MEDICINE

## 2017-07-13 PROCEDURE — 71010 XR CHEST 1 VW: CPT | Performed by: RADIOLOGY

## 2017-07-13 PROCEDURE — 87077 CULTURE AEROBIC IDENTIFY: CPT | Performed by: INTERNAL MEDICINE

## 2017-07-13 PROCEDURE — 07DR3ZX EXTRACTION OF ILIAC BONE MARROW, PERCUTANEOUS APPROACH, DIAGNOSTIC: ICD-10-PCS | Performed by: INTERNAL MEDICINE

## 2017-07-13 PROCEDURE — 71010 XR CHEST AP: CPT | Performed by: RADIOLOGY

## 2017-07-13 PROCEDURE — 88341 IMHCHEM/IMCYTCHM EA ADD ANTB: CPT

## 2017-07-13 PROCEDURE — 85007 BL SMEAR W/DIFF WBC COUNT: CPT | Performed by: INTERNAL MEDICINE

## 2017-07-13 RX ORDER — LORAZEPAM 2 MG/ML
1 INJECTION INTRAMUSCULAR EVERY 6 HOURS PRN
Status: DISCONTINUED | OUTPATIENT
Start: 2017-07-13 | End: 2017-07-14 | Stop reason: HOSPADM

## 2017-07-13 RX ORDER — ACETAMINOPHEN 650 MG/1
650 SUPPOSITORY RECTAL EVERY 6 HOURS PRN
Status: DISCONTINUED | OUTPATIENT
Start: 2017-07-13 | End: 2017-07-14 | Stop reason: HOSPADM

## 2017-07-13 RX ORDER — PROPOFOL 10 MG/ML
VIAL (ML) INTRAVENOUS
Status: COMPLETED
Start: 2017-07-13 | End: 2017-07-14

## 2017-07-13 RX ORDER — LORAZEPAM 2 MG/ML
INJECTION INTRAMUSCULAR
Status: COMPLETED
Start: 2017-07-13 | End: 2017-07-13

## 2017-07-13 RX ORDER — FUROSEMIDE 10 MG/ML
INJECTION INTRAMUSCULAR; INTRAVENOUS
Status: COMPLETED
Start: 2017-07-13 | End: 2017-07-13

## 2017-07-13 RX ORDER — HALOPERIDOL 5 MG/ML
5 INJECTION INTRAMUSCULAR ONCE
Status: COMPLETED | OUTPATIENT
Start: 2017-07-13 | End: 2017-07-13

## 2017-07-13 RX ORDER — LORAZEPAM 2 MG/ML
1 INJECTION INTRAMUSCULAR ONCE
Status: COMPLETED | OUTPATIENT
Start: 2017-07-13 | End: 2017-07-13

## 2017-07-13 RX ORDER — LORAZEPAM 2 MG/ML
0.5 INJECTION INTRAMUSCULAR ONCE
Status: COMPLETED | OUTPATIENT
Start: 2017-07-13 | End: 2017-07-13

## 2017-07-13 RX ORDER — FUROSEMIDE 10 MG/ML
20 INJECTION INTRAMUSCULAR; INTRAVENOUS ONCE
Status: COMPLETED | OUTPATIENT
Start: 2017-07-13 | End: 2017-07-13

## 2017-07-13 RX ORDER — HALOPERIDOL 5 MG/ML
2.5 INJECTION INTRAMUSCULAR EVERY 6 HOURS PRN
Status: DISCONTINUED | OUTPATIENT
Start: 2017-07-13 | End: 2017-07-14 | Stop reason: HOSPADM

## 2017-07-13 RX ORDER — LIDOCAINE HYDROCHLORIDE 10 MG/ML
INJECTION, SOLUTION INFILTRATION; PERINEURAL
Status: COMPLETED
Start: 2017-07-13 | End: 2017-07-13

## 2017-07-13 RX ADMIN — LORAZEPAM 0.5 MG: 2 INJECTION INTRAMUSCULAR; INTRAVENOUS at 09:00

## 2017-07-13 RX ADMIN — ENOXAPARIN SODIUM 30 MG: 30 INJECTION SUBCUTANEOUS at 08:28

## 2017-07-13 RX ADMIN — IPRATROPIUM BROMIDE AND ALBUTEROL SULFATE 3 ML: .5; 3 SOLUTION RESPIRATORY (INHALATION) at 19:23

## 2017-07-13 RX ADMIN — HALOPERIDOL LACTATE 5 MG: 5 INJECTION, SOLUTION INTRAMUSCULAR at 17:39

## 2017-07-13 RX ADMIN — ACETAMINOPHEN 650 MG: 325 TABLET ORAL at 13:03

## 2017-07-13 RX ADMIN — LORAZEPAM 1 MG: 2 INJECTION INTRAMUSCULAR at 22:22

## 2017-07-13 RX ADMIN — LORAZEPAM 0.5 MG: 0.5 TABLET ORAL at 08:28

## 2017-07-13 RX ADMIN — LORAZEPAM 1 MG: 2 INJECTION INTRAMUSCULAR; INTRAVENOUS at 18:35

## 2017-07-13 RX ADMIN — LIDOCAINE HYDROCHLORIDE 20 ML: 10 INJECTION, SOLUTION INFILTRATION; PERINEURAL at 09:25

## 2017-07-13 RX ADMIN — IPRATROPIUM BROMIDE AND ALBUTEROL SULFATE 3 ML: .5; 3 SOLUTION RESPIRATORY (INHALATION) at 07:11

## 2017-07-13 RX ADMIN — IPRATROPIUM BROMIDE AND ALBUTEROL SULFATE 3 ML: .5; 3 SOLUTION RESPIRATORY (INHALATION) at 00:13

## 2017-07-13 RX ADMIN — LORAZEPAM 1 MG: 2 INJECTION INTRAMUSCULAR at 18:35

## 2017-07-13 RX ADMIN — ACETAMINOPHEN 650 MG: 650 SUPPOSITORY RECTAL at 19:34

## 2017-07-13 RX ADMIN — PIPERACILLIN SODIUM,TAZOBACTAM SODIUM 3.38 G: 3; .375 INJECTION, POWDER, FOR SOLUTION INTRAVENOUS at 21:06

## 2017-07-13 RX ADMIN — FUROSEMIDE 20 MG: 10 INJECTION, SOLUTION INTRAMUSCULAR; INTRAVENOUS at 13:33

## 2017-07-13 RX ADMIN — Medication 1 TABLET: at 08:28

## 2017-07-13 RX ADMIN — ASPIRIN 81 MG: 81 TABLET, CHEWABLE ORAL at 08:28

## 2017-07-13 RX ADMIN — VANCOMYCIN HYDROCHLORIDE 1250 MG: 5 INJECTION, POWDER, LYOPHILIZED, FOR SOLUTION INTRAVENOUS at 10:42

## 2017-07-13 RX ADMIN — PIPERACILLIN SODIUM,TAZOBACTAM SODIUM 3.38 G: 3; .375 INJECTION, POWDER, FOR SOLUTION INTRAVENOUS at 08:28

## 2017-07-13 RX ADMIN — FAMOTIDINE 20 MG: 20 TABLET ORAL at 08:28

## 2017-07-13 RX ADMIN — PIPERACILLIN SODIUM,TAZOBACTAM SODIUM 3.38 G: 3; .375 INJECTION, POWDER, FOR SOLUTION INTRAVENOUS at 03:09

## 2017-07-13 RX ADMIN — CLOPIDOGREL BISULFATE 75 MG: 75 TABLET, FILM COATED ORAL at 08:28

## 2017-07-13 RX ADMIN — FLUOXETINE 20 MG: 20 CAPSULE ORAL at 08:28

## 2017-07-13 RX ADMIN — PIPERACILLIN SODIUM,TAZOBACTAM SODIUM 3.38 G: 3; .375 INJECTION, POWDER, FOR SOLUTION INTRAVENOUS at 16:43

## 2017-07-13 RX ADMIN — LORAZEPAM 1 MG: 2 INJECTION INTRAMUSCULAR; INTRAVENOUS at 22:22

## 2017-07-13 RX ADMIN — ACETAMINOPHEN 650 MG: 325 TABLET ORAL at 02:45

## 2017-07-13 RX ADMIN — FUROSEMIDE 20 MG: 10 INJECTION INTRAMUSCULAR; INTRAVENOUS at 13:33

## 2017-07-13 RX ADMIN — SODIUM CHLORIDE 75 ML/HR: 4.5 INJECTION, SOLUTION INTRAVENOUS at 00:30

## 2017-07-13 RX ADMIN — ALLOPURINOL 300 MG: 300 TABLET ORAL at 08:28

## 2017-07-13 RX ADMIN — IPRATROPIUM BROMIDE AND ALBUTEROL SULFATE 3 ML: .5; 3 SOLUTION RESPIRATORY (INHALATION) at 13:17

## 2017-07-14 ENCOUNTER — APPOINTMENT (OUTPATIENT)
Dept: CARDIOLOGY | Facility: HOSPITAL | Age: 77
End: 2017-07-14
Attending: INTERNAL MEDICINE

## 2017-07-14 ENCOUNTER — APPOINTMENT (OUTPATIENT)
Dept: GENERAL RADIOLOGY | Facility: HOSPITAL | Age: 77
End: 2017-07-14

## 2017-07-14 ENCOUNTER — APPOINTMENT (OUTPATIENT)
Dept: ULTRASOUND IMAGING | Facility: HOSPITAL | Age: 77
End: 2017-07-14
Attending: INTERNAL MEDICINE

## 2017-07-14 VITALS
HEART RATE: 88 BPM | TEMPERATURE: 99.5 F | DIASTOLIC BLOOD PRESSURE: 59 MMHG | BODY MASS INDEX: 27.2 KG/M2 | OXYGEN SATURATION: 98 % | SYSTOLIC BLOOD PRESSURE: 107 MMHG | RESPIRATION RATE: 20 BRPM | HEIGHT: 70 IN | WEIGHT: 190 LBS

## 2017-07-14 LAB
A-A DO2: 173.4 MMHG (ref 0–300)
A-A DO2: 192.1 MMHG (ref 0–300)
ABO GROUP BLD: NORMAL
ALBUMIN SERPL-MCNC: 3.3 G/DL (ref 3.4–4.8)
ALBUMIN/GLOB SERPL: 1.2 G/DL (ref 1.5–2.5)
ALP SERPL-CCNC: 106 U/L (ref 40–129)
ALT SERPL W P-5'-P-CCNC: 14 U/L (ref 10–44)
ANION GAP SERPL CALCULATED.3IONS-SCNC: 8.5 MMOL/L (ref 3.6–11.2)
ANISOCYTOSIS BLD QL: ABNORMAL
APTT PPP: 59.2 SECONDS (ref 23.8–36.1)
ARTERIAL PATENCY WRIST A: ABNORMAL
ARTERIAL PATENCY WRIST A: ABNORMAL
AST SERPL-CCNC: 37 U/L (ref 10–34)
ATMOSPHERIC PRESS: 731 MMHG
ATMOSPHERIC PRESS: 731 MMHG
BACTERIA SPEC AEROBE CULT: NO GROWTH
BASE EXCESS BLDA CALC-SCNC: -4.8 MMOL/L
BASE EXCESS BLDA CALC-SCNC: -7.1 MMOL/L
BDY SITE: ABNORMAL
BDY SITE: ABNORMAL
BH CV ECHO MEAS - ACS: 1.7 CM
BH CV ECHO MEAS - AI DEC SLOPE: 157.5 CM/SEC^2
BH CV ECHO MEAS - AI MAX PG: 45 MMHG
BH CV ECHO MEAS - AI MAX VEL: 334.9 CM/SEC
BH CV ECHO MEAS - AI P1/2T: 622.9 MSEC
BH CV ECHO MEAS - AO MAX PG (FULL): 7.7 MMHG
BH CV ECHO MEAS - AO MAX PG: 14.5 MMHG
BH CV ECHO MEAS - AO MEAN PG (FULL): 3.4 MMHG
BH CV ECHO MEAS - AO MEAN PG: 6.5 MMHG
BH CV ECHO MEAS - AO ROOT AREA (BSA CORRECTED): 1.6
BH CV ECHO MEAS - AO ROOT AREA: 8 CM^2
BH CV ECHO MEAS - AO ROOT DIAM: 3.2 CM
BH CV ECHO MEAS - AO V2 MAX: 189.8 CM/SEC
BH CV ECHO MEAS - AO V2 MEAN: 115 CM/SEC
BH CV ECHO MEAS - AO V2 VTI: 27.9 CM
BH CV ECHO MEAS - AVA(I,A): 2.1 CM^2
BH CV ECHO MEAS - AVA(I,D): 2.1 CM^2
BH CV ECHO MEAS - AVA(V,A): 2.1 CM^2
BH CV ECHO MEAS - AVA(V,D): 2.1 CM^2
BH CV ECHO MEAS - BSA(HAYCOCK): 2.1 M^2
BH CV ECHO MEAS - BSA: 2 M^2
BH CV ECHO MEAS - BZI_BMI: 27.3 KILOGRAMS/M^2
BH CV ECHO MEAS - BZI_METRIC_HEIGHT: 177.8 CM
BH CV ECHO MEAS - BZI_METRIC_WEIGHT: 86.2 KG
BH CV ECHO MEAS - CONTRAST EF 4CH: 72.3 ML/M^2
BH CV ECHO MEAS - EDV(MOD-SP4): 65 ML
BH CV ECHO MEAS - EF(MOD-SP4): 72.3 %
BH CV ECHO MEAS - ESV(MOD-SP4): 18 ML
BH CV ECHO MEAS - LV DIASTOLIC VOL/BSA (35-75): 31.8 ML/M^2
BH CV ECHO MEAS - LV MAX PG: 6.8 MMHG
BH CV ECHO MEAS - LV MEAN PG: 3.1 MMHG
BH CV ECHO MEAS - LV SYSTOLIC VOL/BSA (12-30): 8.8 ML/M^2
BH CV ECHO MEAS - LV V1 MAX: 129.3 CM/SEC
BH CV ECHO MEAS - LV V1 MEAN: 80.2 CM/SEC
BH CV ECHO MEAS - LV V1 VTI: 19.4 CM
BH CV ECHO MEAS - LVLD AP4: 6.7 CM
BH CV ECHO MEAS - LVLS AP4: 4.9 CM
BH CV ECHO MEAS - LVOT AREA (M): 3.1 CM^2
BH CV ECHO MEAS - LVOT AREA: 3 CM^2
BH CV ECHO MEAS - LVOT DIAM: 2 CM
BH CV ECHO MEAS - MV A MAX VEL: 89.5 CM/SEC
BH CV ECHO MEAS - MV DEC TIME: 0.2 SEC
BH CV ECHO MEAS - MV E MAX VEL: 110.4 CM/SEC
BH CV ECHO MEAS - MV E/A: 1.2
BH CV ECHO MEAS - MV MAX PG: 4.9 MMHG
BH CV ECHO MEAS - MV MEAN PG: 1.9 MMHG
BH CV ECHO MEAS - MV V2 MAX: 111.1 CM/SEC
BH CV ECHO MEAS - MV V2 MEAN: 62.8 CM/SEC
BH CV ECHO MEAS - MV V2 VTI: 22.4 CM
BH CV ECHO MEAS - MVA(VTI): 2.6 CM^2
BH CV ECHO MEAS - PA ACC SLOPE: 588.7 CM/SEC^2
BH CV ECHO MEAS - PA ACC TIME: 0.11 SEC
BH CV ECHO MEAS - PA MAX PG: 4.3 MMHG
BH CV ECHO MEAS - PA MEAN PG: 2.7 MMHG
BH CV ECHO MEAS - PA PR(ACCEL): 28.3 MMHG
BH CV ECHO MEAS - PA V2 MAX: 103.2 CM/SEC
BH CV ECHO MEAS - PA V2 MEAN: 79.2 CM/SEC
BH CV ECHO MEAS - PA V2 VTI: 16.7 CM
BH CV ECHO MEAS - RAP SYSTOLE: 10 MMHG
BH CV ECHO MEAS - RVSP: 63.5 MMHG
BH CV ECHO MEAS - SI(AO): 109.8 ML/M^2
BH CV ECHO MEAS - SI(LVOT): 28.9 ML/M^2
BH CV ECHO MEAS - SI(MOD-SP4): 23 ML/M^2
BH CV ECHO MEAS - SV(AO): 224.2 ML
BH CV ECHO MEAS - SV(LVOT): 59 ML
BH CV ECHO MEAS - SV(MOD-SP4): 47 ML
BH CV ECHO MEAS - TR MAX VEL: 365.6 CM/SEC
BILIRUB SERPL-MCNC: 2.2 MG/DL (ref 0.2–1.8)
BLD GP AB SCN SERPL QL: NEGATIVE
BNP SERPL-MCNC: 850 PG/ML (ref 0–100)
BODY TEMPERATURE: 98.6 C
BODY TEMPERATURE: 98.6 C
BUN BLD-MCNC: 26 MG/DL (ref 7–21)
BUN/CREAT SERPL: 13.5 (ref 7–25)
CALCIUM SPEC-SCNC: 8.5 MG/DL (ref 7.7–10)
CHLORIDE SERPL-SCNC: 107 MMOL/L (ref 99–112)
CO2 SERPL-SCNC: 22.5 MMOL/L (ref 24.3–31.9)
COHGB MFR BLD: 1.3 % (ref 0–5)
COHGB MFR BLD: 1.7 % (ref 0–5)
CREAT BLD-MCNC: 1.93 MG/DL (ref 0.43–1.29)
CRP SERPL-MCNC: 16.2 MG/DL (ref 0–0.99)
D DIMER PPP FEU-MCNC: 7.15 MCGFEU/ML (ref 0–0.5)
DEPRECATED RDW RBC AUTO: 53.8 FL (ref 37–54)
ERYTHROCYTE [DISTWIDTH] IN BLOOD BY AUTOMATED COUNT: 16.1 % (ref 11.5–14.5)
ERYTHROCYTE [SEDIMENTATION RATE] IN BLOOD: 19 MM/HR (ref 0–20)
ETHNIC BACKGROUND STATED: 33.2 MIU/ML (ref 2.6–18.5)
FIBRINOGEN PPP-MCNC: 451 MG/DL (ref 173–524)
GFR SERPL CREATININE-BSD FRML MDRD: 34 ML/MIN/1.73
GLOBULIN UR ELPH-MCNC: 2.7 GM/DL
GLUCOSE BLD-MCNC: 109 MG/DL (ref 70–110)
GLUCOSE BLDC GLUCOMTR-MCNC: 91 MG/DL (ref 70–130)
GLUCOSE BLDC GLUCOMTR-MCNC: 95 MG/DL (ref 70–130)
HCO3 BLDA-SCNC: 17.6 MMOL/L (ref 22–26)
HCO3 BLDA-SCNC: 19.2 MMOL/L (ref 22–26)
HCT VFR BLD AUTO: 30.5 % (ref 42–52)
HCT VFR BLD CALC: 33 % (ref 42–52)
HCT VFR BLD CALC: 36 % (ref 42–52)
HGB BLD-MCNC: 10.1 G/DL (ref 14–18)
HGB BLDA-MCNC: 11.2 G/DL (ref 12–16)
HGB BLDA-MCNC: 12.2 G/DL (ref 12–16)
HOROWITZ INDEX BLD+IHG-RTO: 45 %
HOROWITZ INDEX BLD+IHG-RTO: 45 %
INR PPP: 1.56 (ref 0.9–1.1)
LDH SERPL-CCNC: 479 U/L (ref 135–225)
LYMPHOCYTES # BLD MANUAL: 3.59 10*3/MM3 (ref 1–3)
LYMPHOCYTES NFR BLD MANUAL: 51 % (ref 0–12)
LYMPHOCYTES NFR BLD MANUAL: 9 % (ref 16–46)
MACROCYTES BLD QL SMEAR: ABNORMAL
MAGNESIUM SERPL-MCNC: 1.4 MG/DL (ref 1.7–2.6)
MCH RBC QN AUTO: 32.7 PG (ref 27–33)
MCHC RBC AUTO-ENTMCNC: 33.1 G/DL (ref 33–37)
MCV RBC AUTO: 98.7 FL (ref 80–94)
METHGB BLD QL: 0.2 % (ref 0–3)
METHGB BLD QL: 0.3 % (ref 0–3)
MODALITY: ABNORMAL
MODALITY: ABNORMAL
MONOCYTES # BLD AUTO: 20.35 10*3/MM3 (ref 0.1–0.9)
MYELOCYTES NFR BLD MANUAL: 2 % (ref 0–0)
NEUTROPHILS # BLD AUTO: 15.17 10*3/MM3 (ref 1.4–6.5)
NEUTROPHILS NFR BLD MANUAL: 35 % (ref 40–75)
NEUTS BAND NFR BLD MANUAL: 3 % (ref 0–8)
NRBC SPEC MANUAL: 1 /100 WBC (ref 0–0)
OSMOLALITY SERPL CALC.SUM OF ELEC: 281 MOSM/KG (ref 273–305)
OXYHGB MFR BLDV: 92 % (ref 85–100)
OXYHGB MFR BLDV: 96 % (ref 85–100)
PCO2 BLDA: 32.2 MM HG (ref 35–45)
PCO2 BLDA: 33 MM HG (ref 35–45)
PEEP RESPIRATORY: 5 CM[H2O]
PEEP RESPIRATORY: 5 CM[H2O]
PH BLDA: 7.34 PH UNITS (ref 7.35–7.45)
PH BLDA: 7.39 PH UNITS (ref 7.35–7.45)
PHOSPHATE SERPL-MCNC: 4.7 MG/DL (ref 2.7–4.5)
PLATELET # BLD AUTO: 57 10*3/MM3 (ref 130–400)
PMV BLD AUTO: 13.1 FL (ref 6–10)
PO2 BLDA: 78.2 MM HG (ref 80–100)
PO2 BLDA: 97.8 MM HG (ref 80–100)
POTASSIUM BLD-SCNC: 3.7 MMOL/L (ref 3.5–5.3)
PROT SERPL-MCNC: 6 G/DL (ref 6–8)
PROTHROMBIN TIME: 18.9 SECONDS (ref 11–15.4)
RBC # BLD AUTO: 3.09 10*6/MM3 (ref 4.7–6.1)
RH BLD: NEGATIVE
SAO2 % BLDCOA: 93.9 % (ref 90–100)
SAO2 % BLDCOA: 97.5 % (ref 90–100)
SCAN SLIDE: NORMAL
SET MECH RESP RATE: 14
SET MECH RESP RATE: 14
SMALL PLATELETS BLD QL SMEAR: ABNORMAL
SODIUM BLD-SCNC: 138 MMOL/L (ref 135–153)
URATE SERPL-MCNC: 6 MG/DL (ref 3.7–7)
VENTILATOR MODE: ABNORMAL
VENTILATOR MODE: ABNORMAL
VT ON VENT VENT: 450 ML
VT ON VENT VENT: 450 ML
WBC NRBC COR # BLD: 39.91 10*3/MM3 (ref 4.5–12.5)

## 2017-07-14 PROCEDURE — 87070 CULTURE OTHR SPECIMN AEROBIC: CPT | Performed by: INTERNAL MEDICINE

## 2017-07-14 PROCEDURE — 94799 UNLISTED PULMONARY SVC/PX: CPT

## 2017-07-14 PROCEDURE — 84550 ASSAY OF BLOOD/URIC ACID: CPT | Performed by: INTERNAL MEDICINE

## 2017-07-14 PROCEDURE — 36430 TRANSFUSION BLD/BLD COMPNT: CPT

## 2017-07-14 PROCEDURE — P9017 PLASMA 1 DONOR FRZ W/IN 8 HR: HCPCS

## 2017-07-14 PROCEDURE — 99291 CRITICAL CARE FIRST HOUR: CPT | Performed by: INTERNAL MEDICINE

## 2017-07-14 PROCEDURE — 82375 ASSAY CARBOXYHB QUANT: CPT | Performed by: INTERNAL MEDICINE

## 2017-07-14 PROCEDURE — 85730 THROMBOPLASTIN TIME PARTIAL: CPT | Performed by: INTERNAL MEDICINE

## 2017-07-14 PROCEDURE — 93306 TTE W/DOPPLER COMPLETE: CPT | Performed by: INTERNAL MEDICINE

## 2017-07-14 PROCEDURE — 87205 SMEAR GRAM STAIN: CPT | Performed by: INTERNAL MEDICINE

## 2017-07-14 PROCEDURE — C1751 CATH, INF, PER/CENT/MIDLINE: HCPCS

## 2017-07-14 PROCEDURE — 86927 PLASMA FRESH FROZEN: CPT

## 2017-07-14 PROCEDURE — 25010000002 PROPOFOL 1000 MG/ML EMULSION: Performed by: INTERNAL MEDICINE

## 2017-07-14 PROCEDURE — 93306 TTE W/DOPPLER COMPLETE: CPT

## 2017-07-14 PROCEDURE — 0B9J8ZX DRAINAGE OF LEFT LOWER LUNG LOBE, VIA NATURAL OR ARTIFICIAL OPENING ENDOSCOPIC, DIAGNOSTIC: ICD-10-PCS | Performed by: INTERNAL MEDICINE

## 2017-07-14 PROCEDURE — 25010000002 VANCOMYCIN PER 500 MG

## 2017-07-14 PROCEDURE — 87086 URINE CULTURE/COLONY COUNT: CPT | Performed by: INTERNAL MEDICINE

## 2017-07-14 PROCEDURE — 82962 GLUCOSE BLOOD TEST: CPT

## 2017-07-14 PROCEDURE — 31624 DX BRONCHOSCOPE/LAVAGE: CPT | Performed by: INTERNAL MEDICINE

## 2017-07-14 PROCEDURE — 87102 FUNGUS ISOLATION CULTURE: CPT | Performed by: INTERNAL MEDICINE

## 2017-07-14 PROCEDURE — 93970 EXTREMITY STUDY: CPT

## 2017-07-14 PROCEDURE — 86256 FLUORESCENT ANTIBODY TITER: CPT | Performed by: INTERNAL MEDICINE

## 2017-07-14 PROCEDURE — 83880 ASSAY OF NATRIURETIC PEPTIDE: CPT | Performed by: INTERNAL MEDICINE

## 2017-07-14 PROCEDURE — 99233 SBSQ HOSP IP/OBS HIGH 50: CPT | Performed by: INTERNAL MEDICINE

## 2017-07-14 PROCEDURE — 0B9D8ZX DRAINAGE OF RIGHT MIDDLE LUNG LOBE, VIA NATURAL OR ARTIFICIAL OPENING ENDOSCOPIC, DIAGNOSTIC: ICD-10-PCS | Performed by: INTERNAL MEDICINE

## 2017-07-14 PROCEDURE — 71010 XR CHEST AP: CPT | Performed by: RADIOLOGY

## 2017-07-14 PROCEDURE — 84100 ASSAY OF PHOSPHORUS: CPT | Performed by: NURSE PRACTITIONER

## 2017-07-14 PROCEDURE — 86901 BLOOD TYPING SEROLOGIC RH(D): CPT | Performed by: INTERNAL MEDICINE

## 2017-07-14 PROCEDURE — 83050 HGB METHEMOGLOBIN QUAN: CPT | Performed by: INTERNAL MEDICINE

## 2017-07-14 PROCEDURE — 25010000002 PROPOFOL 10 MG/ML EMULSION

## 2017-07-14 PROCEDURE — 83520 IMMUNOASSAY QUANT NOS NONAB: CPT | Performed by: INTERNAL MEDICINE

## 2017-07-14 PROCEDURE — 85652 RBC SED RATE AUTOMATED: CPT | Performed by: INTERNAL MEDICINE

## 2017-07-14 PROCEDURE — 25010000002 MIDAZOLAM PER 1 MG

## 2017-07-14 PROCEDURE — 83735 ASSAY OF MAGNESIUM: CPT | Performed by: NURSE PRACTITIONER

## 2017-07-14 PROCEDURE — 71010 XR CHEST 1 VW: CPT | Performed by: RADIOLOGY

## 2017-07-14 PROCEDURE — 86900 BLOOD TYPING SEROLOGIC ABO: CPT | Performed by: INTERNAL MEDICINE

## 2017-07-14 PROCEDURE — 93970 EXTREMITY STUDY: CPT | Performed by: RADIOLOGY

## 2017-07-14 PROCEDURE — 25010000002 MAGNESIUM SULFATE IN D5W 1G/100ML (PREMIX) 1-5 GM/100ML-% SOLUTION: Performed by: NURSE PRACTITIONER

## 2017-07-14 PROCEDURE — 85007 BL SMEAR W/DIFF WBC COUNT: CPT | Performed by: INTERNAL MEDICINE

## 2017-07-14 PROCEDURE — 80053 COMPREHEN METABOLIC PANEL: CPT | Performed by: INTERNAL MEDICINE

## 2017-07-14 PROCEDURE — 83615 LACTATE (LD) (LDH) ENZYME: CPT | Performed by: INTERNAL MEDICINE

## 2017-07-14 PROCEDURE — 87116 MYCOBACTERIA CULTURE: CPT | Performed by: INTERNAL MEDICINE

## 2017-07-14 PROCEDURE — 85610 PROTHROMBIN TIME: CPT | Performed by: INTERNAL MEDICINE

## 2017-07-14 PROCEDURE — 36600 WITHDRAWAL OF ARTERIAL BLOOD: CPT | Performed by: INTERNAL MEDICINE

## 2017-07-14 PROCEDURE — 85379 FIBRIN DEGRADATION QUANT: CPT | Performed by: INTERNAL MEDICINE

## 2017-07-14 PROCEDURE — 82805 BLOOD GASES W/O2 SATURATION: CPT | Performed by: INTERNAL MEDICINE

## 2017-07-14 PROCEDURE — 25010000002 PIPERACILLIN-TAZOBACTAM: Performed by: INTERNAL MEDICINE

## 2017-07-14 PROCEDURE — 94003 VENT MGMT INPAT SUBQ DAY: CPT

## 2017-07-14 PROCEDURE — 0B9F8ZX DRAINAGE OF RIGHT LOWER LUNG LOBE, VIA NATURAL OR ARTIFICIAL OPENING ENDOSCOPIC, DIAGNOSTIC: ICD-10-PCS | Performed by: INTERNAL MEDICINE

## 2017-07-14 PROCEDURE — 25010000002 ENOXAPARIN PER 10 MG: Performed by: INTERNAL MEDICINE

## 2017-07-14 PROCEDURE — 85384 FIBRINOGEN ACTIVITY: CPT | Performed by: INTERNAL MEDICINE

## 2017-07-14 PROCEDURE — 86850 RBC ANTIBODY SCREEN: CPT | Performed by: INTERNAL MEDICINE

## 2017-07-14 PROCEDURE — 85025 COMPLETE CBC W/AUTO DIFF WBC: CPT | Performed by: INTERNAL MEDICINE

## 2017-07-14 PROCEDURE — 86140 C-REACTIVE PROTEIN: CPT | Performed by: INTERNAL MEDICINE

## 2017-07-14 PROCEDURE — 87206 SMEAR FLUORESCENT/ACID STAI: CPT | Performed by: INTERNAL MEDICINE

## 2017-07-14 PROCEDURE — 71010 HC CHEST PA OR AP: CPT

## 2017-07-14 PROCEDURE — 71010 HC CHEST AP: CPT

## 2017-07-14 RX ORDER — LORAZEPAM 2 MG/ML
1 INJECTION INTRAMUSCULAR EVERY 6 HOURS PRN
Qty: 100 ML | Refills: 0
Start: 2017-07-14 | End: 2017-07-23

## 2017-07-14 RX ORDER — SODIUM CHLORIDE 0.9 % (FLUSH) 0.9 %
10 SYRINGE (ML) INJECTION EVERY 12 HOURS SCHEDULED
Status: DISCONTINUED | OUTPATIENT
Start: 2017-07-14 | End: 2017-07-14 | Stop reason: HOSPADM

## 2017-07-14 RX ORDER — SODIUM CHLORIDE 0.9 % (FLUSH) 0.9 %
10 SYRINGE (ML) INJECTION AS NEEDED
Status: DISCONTINUED | OUTPATIENT
Start: 2017-07-14 | End: 2017-07-14 | Stop reason: HOSPADM

## 2017-07-14 RX ORDER — ACETAMINOPHEN 325 MG/1
650 TABLET ORAL EVERY 6 HOURS PRN
Qty: 60 TABLET | Refills: 0
Start: 2017-07-14

## 2017-07-14 RX ORDER — MIDAZOLAM HYDROCHLORIDE 1 MG/ML
INJECTION INTRAMUSCULAR; INTRAVENOUS
Status: COMPLETED
Start: 2017-07-14 | End: 2017-07-14

## 2017-07-14 RX ORDER — PROPOFOL 10 MG/ML
VIAL (ML) INTRAVENOUS
Status: COMPLETED
Start: 2017-07-14 | End: 2017-07-14

## 2017-07-14 RX ORDER — FAMOTIDINE 20 MG/1
20 TABLET, FILM COATED ORAL DAILY
Qty: 60 TABLET | Refills: 3
Start: 2017-07-14

## 2017-07-14 RX ORDER — SODIUM CHLORIDE 9 MG/ML
INJECTION, SOLUTION INTRAVENOUS
Status: DISCONTINUED
Start: 2017-07-14 | End: 2017-07-14 | Stop reason: WASHOUT

## 2017-07-14 RX ORDER — MIDAZOLAM HYDROCHLORIDE 1 MG/ML
2 INJECTION INTRAMUSCULAR; INTRAVENOUS ONCE
Status: COMPLETED | OUTPATIENT
Start: 2017-07-14 | End: 2017-07-14

## 2017-07-14 RX ORDER — MAGNESIUM HYDROXIDE 1200 MG/15ML
LIQUID ORAL
Status: DISCONTINUED
Start: 2017-07-14 | End: 2017-07-14 | Stop reason: HOSPADM

## 2017-07-14 RX ORDER — ONDANSETRON 2 MG/ML
4 INJECTION INTRAMUSCULAR; INTRAVENOUS EVERY 6 HOURS PRN
Qty: 100 ML | Refills: 0
Start: 2017-07-14

## 2017-07-14 RX ORDER — SODIUM CHLORIDE 9 MG/ML
100 INJECTION, SOLUTION INTRAVENOUS CONTINUOUS
Status: DISCONTINUED | OUTPATIENT
Start: 2017-07-14 | End: 2017-07-14

## 2017-07-14 RX ORDER — MAGNESIUM SULFATE 1 G/100ML
1 INJECTION INTRAVENOUS ONCE
Status: COMPLETED | OUTPATIENT
Start: 2017-07-14 | End: 2017-07-14

## 2017-07-14 RX ORDER — SODIUM CHLORIDE 9 MG/ML
INJECTION, SOLUTION INTRAVENOUS
Status: COMPLETED
Start: 2017-07-14 | End: 2017-07-14

## 2017-07-14 RX ORDER — HALOPERIDOL 5 MG/ML
2.5 INJECTION INTRAMUSCULAR EVERY 6 HOURS PRN
Qty: 100 ML | Refills: 0
Start: 2017-07-14

## 2017-07-14 RX ORDER — CARVEDILOL 12.5 MG/1
12.5 TABLET ORAL EVERY 12 HOURS SCHEDULED
Qty: 60 TABLET | Refills: 3 | OUTPATIENT
Start: 2017-07-14

## 2017-07-14 RX ADMIN — MIDAZOLAM HYDROCHLORIDE 2 MG: 1 INJECTION INTRAMUSCULAR; INTRAVENOUS at 07:12

## 2017-07-14 RX ADMIN — ACETAMINOPHEN 650 MG: 650 SUPPOSITORY RECTAL at 14:47

## 2017-07-14 RX ADMIN — PIPERACILLIN SODIUM,TAZOBACTAM SODIUM 3.38 G: 3; .375 INJECTION, POWDER, FOR SOLUTION INTRAVENOUS at 03:17

## 2017-07-14 RX ADMIN — FLUOXETINE 20 MG: 20 CAPSULE ORAL at 09:03

## 2017-07-14 RX ADMIN — FAMOTIDINE 20 MG: 20 TABLET ORAL at 09:02

## 2017-07-14 RX ADMIN — CARVEDILOL 12.5 MG: 6.25 TABLET, FILM COATED ORAL at 09:02

## 2017-07-14 RX ADMIN — PIPERACILLIN SODIUM,TAZOBACTAM SODIUM 3.38 G: 3; .375 INJECTION, POWDER, FOR SOLUTION INTRAVENOUS at 09:02

## 2017-07-14 RX ADMIN — ACETAMINOPHEN 650 MG: 325 TABLET ORAL at 07:38

## 2017-07-14 RX ADMIN — MAGNESIUM SULFATE IN DEXTROSE 1 G: 10 INJECTION, SOLUTION INTRAVENOUS at 11:32

## 2017-07-14 RX ADMIN — Medication 1 TABLET: at 09:02

## 2017-07-14 RX ADMIN — ALLOPURINOL 300 MG: 300 TABLET ORAL at 09:02

## 2017-07-14 RX ADMIN — ENOXAPARIN SODIUM 30 MG: 30 INJECTION SUBCUTANEOUS at 09:05

## 2017-07-14 RX ADMIN — PIPERACILLIN SODIUM,TAZOBACTAM SODIUM 3.38 G: 3; .375 INJECTION, POWDER, FOR SOLUTION INTRAVENOUS at 14:48

## 2017-07-14 RX ADMIN — CLOPIDOGREL BISULFATE 75 MG: 75 TABLET, FILM COATED ORAL at 09:02

## 2017-07-14 RX ADMIN — Medication 10 ML: at 14:50

## 2017-07-14 RX ADMIN — VANCOMYCIN HYDROCHLORIDE 1250 MG: 5 INJECTION, POWDER, LYOPHILIZED, FOR SOLUTION INTRAVENOUS at 09:02

## 2017-07-14 RX ADMIN — PROPOFOL 15 MCG/KG/MIN: 10 INJECTION, EMULSION INTRAVENOUS at 10:10

## 2017-07-14 RX ADMIN — IPRATROPIUM BROMIDE AND ALBUTEROL SULFATE 3 ML: .5; 3 SOLUTION RESPIRATORY (INHALATION) at 13:06

## 2017-07-14 RX ADMIN — PROPOFOL 40 MCG/KG/MIN: 10 INJECTION, EMULSION INTRAVENOUS at 00:00

## 2017-07-14 RX ADMIN — PROPOFOL 30 MCG/KG/MIN: 10 INJECTION, EMULSION INTRAVENOUS at 05:17

## 2017-07-14 RX ADMIN — Medication: at 07:17

## 2017-07-14 RX ADMIN — SODIUM CHLORIDE 500 ML: 900 INJECTION, SOLUTION INTRAVENOUS at 10:57

## 2017-07-14 RX ADMIN — IPRATROPIUM BROMIDE AND ALBUTEROL SULFATE 3 ML: .5; 3 SOLUTION RESPIRATORY (INHALATION) at 00:55

## 2017-07-14 RX ADMIN — MIDAZOLAM HYDROCHLORIDE 2 MG: 1 INJECTION, SOLUTION INTRAMUSCULAR; INTRAVENOUS at 07:12

## 2017-07-14 RX ADMIN — IPRATROPIUM BROMIDE AND ALBUTEROL SULFATE 3 ML: .5; 3 SOLUTION RESPIRATORY (INHALATION) at 06:50

## 2017-07-14 RX ADMIN — ASPIRIN 81 MG: 81 TABLET, CHEWABLE ORAL at 09:02

## 2017-07-15 LAB
ABO + RH BLD: NORMAL
ABO + RH BLD: NORMAL
BACTERIA SPEC AEROBE CULT: NO GROWTH
BACTERIA SPEC RESP CULT: NORMAL
BH BB BLOOD EXPIRATION DATE: NORMAL
BH BB BLOOD EXPIRATION DATE: NORMAL
BH BB BLOOD TYPE BARCODE: 2800
BH BB BLOOD TYPE BARCODE: 8400
BH BB DISPENSE STATUS: NORMAL
BH BB DISPENSE STATUS: NORMAL
BH BB PRODUCT CODE: NORMAL
BH BB PRODUCT CODE: NORMAL
BH BB UNIT NUMBER: NORMAL
BH BB UNIT NUMBER: NORMAL
COPPER SERPL-MCNC: 92 UG/DL (ref 72–166)
GRAM STN SPEC: NORMAL
UNIT  ABO: NORMAL
UNIT  ABO: NORMAL
UNIT  RH: NORMAL
UNIT  RH: NORMAL

## 2017-07-16 LAB
BACTERIA SPEC AEROBE CULT: NO GROWTH
BACTERIA SPEC RESP CULT: NO GROWTH
GRAM STN SPEC: NORMAL

## 2017-07-17 LAB
BACTERIA SPEC AEROBE CULT: NORMAL
BACTERIA SPEC RESP CULT: ABNORMAL
BACTERIA SPEC RESP CULT: ABNORMAL
C-ANCA TITR SER IF: NORMAL TITER
GRAM STN SPEC: ABNORMAL
MYELOPEROXIDASE AB SER-ACNC: <9 U/ML (ref 0–9)
P-ANCA ATYPICAL TITR SER IF: NORMAL TITER
P-ANCA TITR SER IF: NORMAL TITER
PROTEINASE3 AB SER IA-ACNC: <3.5 U/ML (ref 0–3.5)

## 2017-07-18 LAB
BACTERIA SPEC AEROBE CULT: NORMAL
CYTOLOGIST CVX/VAG CYTO: NORMAL
INTERPRETATION: NORMAL
JAK2 EXON 12 MUT TESTED BLD/T: NORMAL
LAB DIRECTOR NAME PROVIDER: NORMAL
PATH INTERP BLD-IMP: NORMAL
REF LAB TEST METHOD: NORMAL
RICK SF IGG TITR SER IF: NORMAL {TITER}
RICK SF IGM TITR SER IF: NORMAL {TITER}
SPECIMEN STATUS: NORMAL
T(ABL1,BCR)B2A2/CONTROL BLD/T: NORMAL %
T(ABL1,BCR)B3A2/CONTROL BLD/T: NORMAL %
T(ABL1,BCR)E1A2/CONTROL BLD/T: NORMAL %
TYPHUS FEVER GROUP IGG: NORMAL
TYPHUS FEVER GROUP IGM: NORMAL

## 2017-08-02 PROBLEM — C92.00 ACUTE MYELOID LEUKEMIA (HCC): Status: ACTIVE | Noted: 2017-08-02

## 2017-08-02 LAB — KARYOTYP MAR: NORMAL

## 2017-08-02 NOTE — DISCHARGE SUMMARY
Date of Discharge:  7/14/2017    Discharge Diagnosis:   Active Problems:    Pneumonia    COPD (chronic obstructive pulmonary disease)    ASHD (arteriosclerotic heart disease)    DJD (degenerative joint disease)    Monocytosis    Thrombocytopenia    Normocytic anemia      Presenting Problem/History of Present Illness  See History and Physical for Presenting Problems / Illnesses.       Hospital Course  Patient is a 76 y.o. male presented with acute respiratory failure. He was felt to have pneumonia and possible sepsis with COPD. He was found to have marked leukocytosis. 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 He developed worsening of his mental and respiratory status and required intubation, sedation and mechanical ventilation. Due to his acute leukemia and critical illness, he was transferred to  for tertiary  Care. He was stable at the time of transfer, but prognosis is poor.     Procedures Performed         Consults:   Consults     Date and Time Order Name Status Description    7/13/2017 1841 Inpatient Consult to Pulmonology Completed     7/12/2017 1220 Inpatient Consult to Hematology & Oncology Completed           Pertinent Test Results:    Imaging Results (last 72 hours)     Procedure Component Value Units Date/Time    CT Chest Without Contrast [[id number]] Collected:  07/12/17 0702     Updated:  07/12/17 0705    Narrative:       CT CHEST WO CONTRAST-        TECHNIQUE: Multiple axial CT images were obtained from lung apex through  upper abdomen without administration of IV contrast. Reformatted images  in the coronal and/or sagittal plane(s) were generated from the axial  data set to facilitate diagnostic accuracy and/or surgical planning.  Oral Contrast:NONE.     Radiation dose reduction techniques were utilized per ALARA protocol.  Automated exposure control was initiated through either or CareDose or  DoseRigParkinsor software packages by  protocol.          453.48  mGy.cm  Clinical information  pain      Comparison  NONE.     Findings  LUNGS: BIBASILAR ATELECTASIS, VAGUE BIBASILAR GROUNDGLASS ATTENUATION  AND SUBCORTICAL PULMONARY FIBROTIC CHANGE.     HEART: CORONARY ARTERY CALCIFICATION NOTED.     PERICARDIUM: No effusion.     MEDIASTINUM: No masses. No enlarged lymph nodes.  No fluid collections.     PLEURA: No pleural effusion. No pleural mass or abnormal calcification.     MAJOR AIRWAYS: Clear. No intrinsic mass.     VASCULATURE: No evidence of aneurysm.     VISUALIZED UPPER ABDOMEN:        LIVER: Homogeneous. No focal hepatic mass or ductal dilatation.        SPLEEN: Homogeneous. No splenomegaly.        ADRENALS: No mass.        KIDNEYS: No mass. No obstructive uropathy.  No evidence of  urolithiasis.        GI TRACT: Non-dilated. No definite wall thickening.        PERITONEUM: No free air. No free fluid or loculated fluid  collections.           ABDOMINAL WALL: No focal hernia or mass.           OTHER: None.     BONES: No acute bony abnormality.       Impression:       Impression:  Some bibasilar groundglass attenuation that could reflect sequelae of  chronic lung fibrosis or potentially some pulmonary edema.     This report was finalized on 7/12/2017 7:03 AM by Dr. Wero Worthington MD.       XR Chest AP [[id number]] Collected:  07/12/17 0703     Updated:  07/12/17 0706    Narrative:       EXAMINATION: XR CHEST AP-      CLINICAL INDICATION:     chest tightness     TECHNIQUE:  XR CHEST AP-      COMPARISON: NONE      FINDINGS:   PROBABLE CHRONIC PULMONARY FIBROSIS. SOME SUPERIMPOSED INTERSTITIAL  DISEASE NOT EXCLUDED.  Heart and mediastinum contours are unremarkable.  No pleural effusion.  No pneumothorax.   Bony and soft tissue structures are unremarkable.       Impression:       PROBABLE CHRONIC PULMONARY FIBROSIS. SOME SUPERIMPOSED  INTERSTITIAL DISEASE NOT EXCLUDED.     This report was finalized on 7/12/2017 7:04 AM by Dr. Wero Worthington MD.       CT Head Without Contrast  [[id number]] Collected:  07/12/17 0704     Updated:  07/12/17 0706    Narrative:          EXAMINATION: CT HEAD WO CONTRAST-      CLINICAL INDICATION:     AMS     TECHNIQUE: Contiguous axial CT images of the head were obtained without  contrast administration.      Radiation dose reduction techniques were utilized per ALARA protocol.  Automated exposure control was initiated through either or Options Away or  DoseRight software packages by  protocol.       625.26 mGy.cm     COMPARISON:  None.       FINDINGS: Generalized cerebral atrophy is present. There is no mass  effect, midline displacement, or hydrocephalus. There are patchy areas  of decreased density within the periventricular white matter which  likely reflect chronic small vessel ischemic changes. There is no CT  evidence of acute infarct or hemorrhage. Bone windows reveal no osseous  abnormalities or fractures.        Impression:       Atrophy and chronic small vessel ischemic change, but there  are no acute intracranial abnormalities identified.         This report was finalized on 7/12/2017 7:04 AM by Dr. Wero Worthington MD.       XR Chest AP [[id number]] Collected:  07/13/17 1448     Updated:  07/13/17 1459    Narrative:       EXAMINATION:  XR CHEST AP-      CLINICAL INDICATION:     change in respiratory status; J18.9-Pneumonia,  unspecified organism     TECHNIQUE:  XR CHEST AP-       COMPARISON: 07/12/2017      FINDINGS:   Patchy bilateral bibasilar airspace disease that in some part may  represent chronic interstitial fibrosis.   Heart size is stable.   No pneumothorax.   No pleural effusion.   Bony and soft tissue structures are unremarkable.            Impression:       Patchy bilateral bibasilar airspace disease that in some  part may represent chronic interstitial fibrosis.      This report was finalized on 7/13/2017 2:49 PM by Dr. Wero Worthington MD.       XR Chest AP [[id number]] Collected:  07/14/17 0632     Updated:  07/14/17 0634     Narrative:       EXAMINATION: XR CHEST AP-      CLINICAL INDICATION:     cough; J18.9-Pneumonia, unspecified organism     TECHNIQUE: Single AP view of chest.      COMPARISON: NONE      FINDINGS:   There is bibasilar atelectasis.   Patchy bilateral airspace disease.  ET tube at level of clavicles. NG tube in the stomach.   Heart size is stable.  No pneumothorax.   Tiny bilateral pleural effusions persist.        Impression:       As above.        This report was finalized on 7/14/2017 6:32 AM by Dr. Wero Worthington MD.       XR Chest 1 View [[id number]] Collected:  07/14/17 0632     Updated:  07/14/17 0635    Narrative:       EXAMINATION: XR CHEST 1 VW-      CLINICAL INDICATION:     ett placement; J18.9-Pneumonia, unspecified  organism     TECHNIQUE:  XR CHEST 1 VW-      COMPARISON: NONE      FINDINGS:   NG tube in the stomach. ET tube at level of clavicles. The lungs are  aerated other than patchy bilateral airspace disease.       Impression:       NG TUBE IN THE STOMACH.     This report was finalized on 7/14/2017 6:33 AM by Dr. Wero Worthington MD.       CT Head Without Contrast Stroke Protocol [[id number]] Collected:  07/14/17 0633     Updated:  07/14/17 0635    Narrative:          EXAMINATION: CT HEAD WO CONTRAST STROKE PROTOCOL-      CLINICAL INDICATION:     confusion; J18.9-Pneumonia, unspecified  organism     TECHNIQUE: Contiguous axial CT images of the head were obtained without  contrast administration.      Radiation dose reduction techniques were utilized per ALARA protocol.  Automated exposure control was initiated through either or [employer]Stat or  DoseRigSHOP.CA software packages by  protocol.       695.67 mGy.cm     COMPARISON:  None.       FINDINGS: Generalized cerebral atrophy is present. There is no mass  effect, midline displacement, or hydrocephalus. There are patchy areas  of decreased density within the periventricular white matter which  likely reflect chronic small vessel ischemic changes. There  is no CT  evidence of acute infarct or hemorrhage. Bone windows reveal no osseous  abnormalities or fractures.        Impression:       Atrophy and chronic small vessel ischemic change, but there  are no acute intracranial abnormalities identified.         This report was finalized on 7/14/2017 6:33 AM by Dr. Wero Worthington MD.       XR Chest 1 View [[id number]] Collected:  07/14/17 1357     Updated:  07/14/17 1437    Narrative:       EXAMINATION: XR CHEST 1 VW-      CLINICAL INDICATION:     cxr after picc insertion; J18.9-Pneumonia,  unspecified organism     TECHNIQUE: Single AP view of chest.      COMPARISON: 07/14/2017      FINDINGS:   There is bibasilar atelectasis.   Stable bilateral airspace disease. Right PICC has been placed with tip  in SVC.  Support tube and lines are in unchanged position.   Heart size is stable.  No pneumothorax.   Tiny bilateral pleural effusions persist.        Impression:       Right PICC has been placed with tip in SVC.     This report was finalized on 7/14/2017 1:57 PM by Dr. Wero Worthington MD.       US Venous Doppler Lower Extremity Bilateral (duplex) [[id number]] Collected:  07/14/17 1600     Updated:  07/14/17 1602    Narrative:       EXAMINATION: US VENOUS DOPPLER LOWER EXTREMITY BILATERAL (DUPLEX)-      CLINICAL INDICATION:     Pain and Swelling     TECHNIQUE: Multiplanar gray scale and Doppler vascular sonographic  imaging of the deep veins of BILATERAL lower extremity, without and with  compression.      COMPARISON: NONE      FINDINGS: The visualized deep veins demonstrate flow and are  compressible. No evidence of deep venous thrombosis.             Impression:       No DVT.     This report was finalized on 7/14/2017 4:00 PM by Dr. Zackary Magallanes MD.           Lab Results (last 72 hours)     Procedure Component Value Units Date/Time    Blood Gas, Arterial [[id number]]  (Abnormal) Collected:  07/12/17 0223    Specimen:  Arterial Blood Updated:  07/12/17 0230     Site Arterial:  right radial     Slava's Test Positive     pH, Arterial 7.460 (H) pH units      pCO2, Arterial 29.8 (C) mm Hg      pO2, Arterial 76.1 (L) mm Hg      HCO3, Arterial 20.7 (L) mmol/L      Base Excess, Arterial -2.0 mmol/L      O2 Saturation, Arterial 95.6 %      Hemoglobin, Blood Gas 13.3 g/dL      Hematocrit, Blood Gas 39.0 (L) %      Oxyhemoglobin 93.2 %      Methemoglobin 0.3 %      Carboxyhemoglobin 2.2 %      A-a Gradiant 79.7 mmHg      Temperature 98.6 C      Barometric Pressure for Blood Gas 729 mmHg      Modality Nasal Cannula     FIO2 28 %     Comprehensive Metabolic Panel [[id number]]  (Abnormal) Collected:  07/12/17 0203    Specimen:  Blood from Arm, Left Updated:  07/12/17 0238     Glucose 136 (H) mg/dL      BUN 24 (H) mg/dL      Creatinine 1.79 (H) mg/dL      Sodium 135 mmol/L      Potassium 4.0 mmol/L      Chloride 104 mmol/L      CO2 22.0 (L) mmol/L      Calcium 8.8 mg/dL      Total Protein 7.0 g/dL      Albumin 4.00 g/dL      ALT (SGPT) 19 U/L      AST (SGOT) 40 (H) U/L      Alkaline Phosphatase 144 (H) U/L       Note New Reference Ranges        Total Bilirubin 1.5 mg/dL       1+ Icteric         eGFR Non African Amer 37 (L) mL/min/1.73      Globulin 3.0 gm/dL      A/G Ratio 1.3 (L) g/dL      BUN/Creatinine Ratio 13.4     Anion Gap 9.0 mmol/L     Narrative:       The MDRD GFR formula is only valid for adults with stable renal function between ages 18 and 70.    Osmolality, Calculated [[id number]]  (Normal) Collected:  07/12/17 0203    Specimen:  Blood from Arm, Left Updated:  07/12/17 0238     Osmolality Calc 276.2 mOsm/kg     Myoglobin, Serum [[id number]]  (Normal) Collected:  07/12/17 0203    Specimen:  Blood from Arm, Left Updated:  07/12/17 0243     Myoglobin 81.0 ng/mL     BNP [[id number]]  (Abnormal) Collected:  07/12/17 0203    Specimen:  Blood from Arm, Left Updated:  07/12/17 0243     .0 (H) pg/mL     Troponin [[id number]]  (Normal) Collected:  07/12/17 0203    Specimen:  Blood from Arm, Left  Updated:  07/12/17 0243     Troponin I <0.006 ng/mL     Narrative:       Ultra Troponin I Reference Range:         <=0.039 ng/mL: Negative    0.04-0.779 ng/mL: Indeterminate Range. Suspicious of MI.  Clinical correlation required.       >=0.78  ng/mL: Consistent with myocardial injury.  Clinical correlation required.    CK-MB Index [[id number]]  (Normal) Collected:  07/12/17 0203    Specimen:  Blood from Arm, Left Updated:  07/12/17 0243     CK-MB Index 1.2 %     CK-MB [[id number]]  (Normal) Collected:  07/12/17 0203    Specimen:  Blood from Arm, Left Updated:  07/12/17 0246     CKMB 0.55 ng/mL     CK [[id number]]  (Normal) Collected:  07/12/17 0203    Specimen:  Blood from Arm, Left Updated:  07/12/17 0246     Creatine Kinase 46 U/L       1+ Icteric        CBC Auto Differential [[id number]]  (Abnormal) Collected:  07/12/17 0203    Specimen:  Blood from Arm, Left Updated:  07/12/17 0309     WBC 35.95 (C) 10*3/mm3      RBC 3.91 (L) 10*6/mm3      Hemoglobin 13.0 (L) g/dL      Hematocrit 37.6 (L) %      MCV 96.2 (H) fL      MCH 33.2 (H) pg      MCHC 34.6 g/dL      RDW 15.3 (H) %      RDW-SD 50.4 fl      MPV 13.3 (H) fL      Platelets 70 (L) 10*3/mm3     Scan Slide [[id number]] Collected:  07/12/17 0203    Specimen:  Blood from Arm, Left Updated:  07/12/17 0309     Scan Slide --      See Manual Differential Results       POC Glucose Fingerstick [[id number]]  (Normal) Collected:  07/12/17 0717    Specimen:  Blood Updated:  07/12/17 0726     Glucose 128 mg/dL     Narrative:       Meter: [id number] : [id number] David Rubi    Urine Drug Screen [[id number]]  (Normal) Collected:  07/12/17 0817    Specimen:  Urine from Urine, Clean Catch Updated:  07/12/17 0851     Amphetamine Screen, Urine Negative     Barbiturates Screen, Urine Negative     Benzodiazepine Screen, Urine Negative     Cocaine Screen, Urine Negative     Methadone Screen, Urine Negative     Opiate Screen Negative     Phencyclidine (PCP), Urine Negative     THC,  Screen, Urine Negative     6-ACETYL MORPHINE Negative     Buprenorphine, Screen, Urine Negative     Oxycodone Screen, Urine Negative    Narrative:       Negative Thresholds For Drugs Screened:                  Amphetamines              1000 ng/ml               Barbiturates               200 ng/ml               Benzodiazepines            200 ng/ml              Cocaine                    300 ng/ml              Methadone                  300 ng/ml              Opiates                    300 ng/ml               Phencyclidine               25 ng/ml               THC                         50 ng/ml              6-Acetyl Morphine           10 ng/ml              Buprenorphine                5 ng/ml              Oxycodone                  300 ng/ml    The reference range for all drugs tested is negative. This report includes final unconfirmed qualitative results to be used for medical treatment purposes only. Unconfirmed results must not be used for non-medical purposes such as employment or legal testing. Clinical consideration should be applied to any drug of abuse test, especially when unconfirmed quantitative results are used.      Mycoplasma Pneumoniae Antibody, IgM [[id number]]  (Normal) Collected:  07/12/17 0706    Specimen:  Blood Updated:  07/12/17 0855     Mycoplasma pneumo IgM Negative    Urinalysis With / Culture If Indicated [[id number]]  (Abnormal) Collected:  07/12/17 0817    Specimen:  Urine from Urine, Clean Catch Updated:  07/12/17 0859     Color, UA Dark Yellow (A)     Appearance, UA Cloudy (A)     pH, UA <=5.0     Specific Gravity, UA >=1.030     Glucose, UA Negative     Ketones, UA Trace (A)     Bilirubin, UA Small (1+) (A)     Blood, UA Negative     Protein,  mg/dL (2+) (A)     Leuk Esterase, UA Trace (A)     Nitrite, UA Negative     Urobilinogen, UA 1.0 E.U./dL    Urinalysis, Microscopic Only [[id number]]  (Abnormal) Collected:  07/12/17 0817    Specimen:  Urine from Urine, Clean Catch Updated:   07/12/17 0939     RBC, UA None Seen /HPF      WBC, UA 3-5 (A) /HPF      Bacteria, UA 3+ (A) /HPF      Squamous Epithelial Cells, UA None Seen /HPF      Hyaline Casts, UA None Seen /LPF      Granular Casts, UA 7-12 /LPF      Methodology Manual Light Microscopy    Troponin [[id number]]  (Normal) Collected:  07/12/17 0508    Specimen:  Blood from Arm, Right Updated:  07/12/17 0940     Troponin I <0.006 ng/mL     Narrative:       Ultra Troponin I Reference Range:         <=0.039 ng/mL: Negative    0.04-0.779 ng/mL: Indeterminate Range. Suspicious of MI.  Clinical correlation required.       >=0.78  ng/mL: Consistent with myocardial injury.  Clinical correlation required.    Lipase [[id number]]  (Normal) Collected:  07/12/17 0508    Specimen:  Blood from Arm, Right Updated:  07/12/17 0953     Lipase 31 U/L     Amylase [[id number]]  (Normal) Collected:  07/12/17 0508    Specimen:  Blood from Arm, Right Updated:  07/12/17 0953     Amylase 46 U/L     POC Glucose Fingerstick [[id number]]  (Abnormal) Collected:  07/12/17 1045    Specimen:  Blood Updated:  07/12/17 1052     Glucose 152 (H) mg/dL     Narrative:       Meter: [id number] : [id number] David Rubi    Slide Review, Hematology [[id number]] Collected:  07/12/17 0203    Specimen:  Blood from Arm, Left Updated:  07/12/17 1151     Performed by: Bernie Ann M.D.     Pathologist Interpretation --     Leukocytosis with numerous atypical monocytoid cells and scattered immature granulocytes, accompanied by mild anemia and moderate thrombocytopenia, suspicious for monocytic/myelomonocytic leukemia or other bone marrow disorder. Hematologic workup recommended. Findings discussed with Dr. Mondragon.     Leukemia / Lymphoma Eval [[id number]] Collected:  07/12/17 1346    Specimen:  Blood Updated:  07/12/17 1352    CBC & Differential [[id number]] Collected:  07/12/17 0203    Specimen:  Blood Updated:  07/12/17 1554    Narrative:       The following orders were created for panel  order CBC & Differential.  Procedure                               Abnormality         Status                     ---------                               -----------         ------                     Manual Differential[[id number]]          Abnormal            Edited Result - FINAL      Scan Slide[[id number]]                                       Final result               CBC Auto Differential[[id number]]        Abnormal            Final result               Slide Review, Hematology[[id number]]                         Final result                 Please view results for these tests on the individual orders.    Manual Differential [[id number]]  (Abnormal) Collected:  07/12/17 0203    Specimen:  Blood from Arm, Left Updated:  07/12/17 1554     Neutrophil % 38.0 (L) %       Corrected result. Previous result was 39.0 % on 7/12/2017 at 0309 EDT        Lymphocyte % 10.0 (L) %       Corrected result. Previous result was 8.0 % on 7/12/2017 at 0309 EDT        Monocyte % 44.0 (H) %       Atypical monocytes noted, referred to pathologist.  Corrected result. Previous result was 10.0 % on 7/12/2017 at 0309 EDT        Bands %  -- %       Corrected result. Previous result was 5.0 % on 7/12/2017 at 0309 EDT        Metamyelocyte % 5.0 (H) %       Corrected result. Previous result was 23.0 % on 7/12/2017 at 0309 EDT        Myelocyte % 3.0 (H) %       Corrected result. Previous result was 9.0 % on 7/12/2017 at 0309 EDT        Promyelocyte % -- %       Corrected result. Previous result was 6.0 % on 7/12/2017 at 0309 EDT        Neutrophils Absolute 13.66 (H) 10*3/mm3       Corrected result. Previous result was 15.82 10*3/mm3 on 7/12/2017 at 0309 EDT        Lymphocytes Absolute 3.60 (H) 10*3/mm3       Corrected result. Previous result was 2.88 10*3/mm3 on 7/12/2017 at 0309 EDT        Monocytes Absolute 15.82 (H) 10*3/mm3       Corrected result. Previous result was 3.60 10*3/mm3 on 7/12/2017 at 0309 EDT        Anisocytosis Slight/1+       Appended report.  These results have been appended to a previously final verified report.        Platelet Estimate Decreased    Narrative:       The previously reported component RBC Morphology is no longer being reported.    Uric Acid [[id number]]  (Abnormal) Collected:  07/12/17 1508    Specimen:  Blood Updated:  07/12/17 1621     Uric Acid 7.6 (H) mg/dL     Lactate Dehydrogenase [[id number]]  (Abnormal) Collected:  07/12/17 1508    Specimen:  Blood Updated:  07/12/17 1621      (H) U/L     POC Glucose Fingerstick [[id number]]  (Normal) Collected:  07/12/17 1635    Specimen:  Blood Updated:  07/12/17 1658     Glucose 107 mg/dL     Narrative:       Meter: [id number] : [id number] jake green    Legionella Antigen, Urine [[id number]]  (Normal) Collected:  07/12/17 0817    Specimen:  Urine from Urine, Clean Catch Updated:  07/12/17 1847     LEGIONELLA ANTIGEN, URINE Negative    Narrative:         Presumptive negative for L. pneumophilia serogroup 1 antigen, suggesting no recent or current infection.    POC Glucose Fingerstick [[id number]]  (Normal) Collected:  07/12/17 2002    Specimen:  Blood Updated:  07/12/17 2008     Glucose 108 mg/dL     Narrative:       Meter: [id number] : [id number] musa davis    Reticulocytes [[id number]]  (Abnormal) Collected:  07/13/17 0356    Specimen:  Blood Updated:  07/13/17 0432     Reticulocyte % 2.38 (H) %      Reticulocyte Absolute 0.0766 10*6/mm3     Uric Acid [[id number]]  (Normal) Collected:  07/13/17 0356    Specimen:  Blood Updated:  07/13/17 0454     Uric Acid 7.0 mg/dL     Lactate Dehydrogenase [[id number]]  (Abnormal) Collected:  07/13/17 0356    Specimen:  Blood Updated:  07/13/17 0454      (H) U/L     Comprehensive Metabolic Panel [[id number]]  (Abnormal) Collected:  07/13/17 0356    Specimen:  Blood Updated:  07/13/17 0500     Glucose 126 (H) mg/dL      BUN 26 (H) mg/dL      Creatinine 1.60 (H) mg/dL      Sodium 137 mmol/L      Potassium 3.8 mmol/L       Chloride 106 mmol/L      CO2 21.4 (L) mmol/L      Calcium 8.4 mg/dL      Total Protein 5.9 (L) g/dL      Albumin 3.30 (L) g/dL      ALT (SGPT) 13 U/L      AST (SGOT) 32 U/L      Alkaline Phosphatase 112 U/L       Note New Reference Ranges        Total Bilirubin 1.9 (H) mg/dL       1+ Icteric         eGFR Non African Amer 42 (L) mL/min/1.73      Globulin 2.6 gm/dL      A/G Ratio 1.3 (L) g/dL      BUN/Creatinine Ratio 16.3     Anion Gap 9.6 mmol/L     Narrative:       The MDRD GFR formula is only valid for adults with stable renal function between ages 18 and 70.    Osmolality, Calculated [[id number]]  (Normal) Collected:  07/13/17 0356    Specimen:  Blood Updated:  07/13/17 0500     Osmolality Calc 280.1 mOsm/kg     Iron Profile [[id number]]  (Abnormal) Collected:  07/13/17 0356    Specimen:  Blood Updated:  07/13/17 0500     Iron 37 (L) mcg/dL      TIBC 219 (L) mcg/dL      Iron Saturation 17 (L) %     Folate [[id number]]  (Abnormal) Collected:  07/13/17 0356    Specimen:  Blood Updated:  07/13/17 0502     Folate >24.00 (H) ng/mL     Vitamin B12 [[id number]]  (Abnormal) Collected:  07/13/17 0356    Specimen:  Blood Updated:  07/13/17 0502     Vitamin B-12 1282 (H) pg/mL     Ferritin [[id number]]  (Abnormal) Collected:  07/13/17 0356    Specimen:  Blood Updated:  07/13/17 0502     Ferritin 1018.00 (H) ng/mL     CBC & Differential [[id number]] Collected:  07/13/17 0356    Specimen:  Blood Updated:  07/13/17 0514    Narrative:       The following orders were created for panel order CBC & Differential.  Procedure                               Abnormality         Status                     ---------                               -----------         ------                     Manual Differential[[id number]]          Abnormal            Final result               Scan Slide[[id number]]                                                                  CBC Auto Differential[[id number]]        Abnormal            Final result                  Please view results for these tests on the individual orders.    CBC Auto Differential [[id number]]  (Abnormal) Collected:  07/13/17 0356    Specimen:  Blood Updated:  07/13/17 0514     WBC 37.65 (C) 10*3/mm3      RBC 3.22 (L) 10*6/mm3      Hemoglobin 10.4 (L) g/dL      Hematocrit 30.3 (L) %      MCV 94.1 (H) fL      MCH 32.3 pg      MCHC 34.3 g/dL      RDW 15.5 (H) %      RDW-SD 52.8 fl      MPV -- fL       Unable to calculate.         Platelets 48 (C) 10*3/mm3     Manual Differential [[id number]]  (Abnormal) Collected:  07/13/17 0356    Specimen:  Blood Updated:  07/13/17 0514     Neutrophil % 37.0 (L) %      Lymphocyte % 8.0 (L) %      Monocyte % 50.0 (H) %       Atypical monocytes present, see previous path review        Metamyelocyte % 3.0 (H) %      Myelocyte % 2.0 (H) %      Neutrophils Absolute 13.93 (H) 10*3/mm3      Lymphocytes Absolute 3.01 (H) 10*3/mm3      Monocytes Absolute 18.83 (H) 10*3/mm3      Anisocytosis Slight/1+     Platelet Morphology Normal    HIV-1 & HIV-2 Antibodies [[id number]] Collected:  07/13/17 0356    Specimen:  Blood Updated:  07/13/17 0534    Narrative:       The following orders were created for panel order HIV-1 & HIV-2 Antibodies.  Procedure                               Abnormality         Status                     ---------                               -----------         ------                     HIV-1 / O / 2 Ag / Antib...[[id number]]  Normal              Final result                 Please view results for these tests on the individual orders.    HIV-1 / O / 2 Ag / Antibody 4th Generation [[id number]]  (Normal) Collected:  07/13/17 0356    Specimen:  Blood Updated:  07/13/17 0534     HIV-1/ HIV-2 Non-Reactive    Narrative:         A non-reactive test result does not preclude the possibility of exposure to HIV or infection with HIV. An antibody response to recent exposure may take several months to reach detectable levels.    Hepatitis Panel, Acute [[id number]]  (Normal)  Collected:  07/13/17 0356    Specimen:  Blood Updated:  07/13/17 0535     Hepatitis B Surface Ag Non-Reactive     Hep A IgM Non-Reactive     Hep B C IgM Non-Reactive     Hepatitis C Ab Non-Reactive    POC Glucose Fingerstick [[id number]]  (Normal) Collected:  07/13/17 0626    Specimen:  Blood Updated:  07/13/17 0645     Glucose 116 mg/dL     Narrative:       Meter: [id number] : [id number] SIMRAN PHAN    POC Glucose Fingerstick [[id number]]  (Normal) Collected:  07/13/17 1108    Specimen:  Blood Updated:  07/13/17 1119     Glucose 110 mg/dL     Narrative:       Meter: [id number] : [id number] SIMRAN PHAN    Blood Gas, Arterial [[id number]]  (Abnormal) Collected:  07/13/17 1354    Specimen:  Arterial Blood Updated:  07/13/17 1401     Site Arterial: right radial     Slava's Test Positive     pH, Arterial 7.444 pH units      pCO2, Arterial 28.6 (C) mm Hg      pO2, Arterial 90.5 mm Hg      HCO3, Arterial 19.2 (C) mmol/L      Base Excess, Arterial -3.8 mmol/L      O2 Saturation, Arterial 97.1 %      Hemoglobin, Blood Gas 12.2 g/dL      Hematocrit, Blood Gas 36.0 (L) %      Oxyhemoglobin 95.2 %      Methemoglobin 0.4 %      Carboxyhemoglobin 1.6 %      A-a Gradiant 219.3 mmHg      Temperature 98.6 C      Barometric Pressure for Blood Gas 731 mmHg      Modality Venturi mask     FIO2 50 %     CBC & Differential [[id number]] Collected:  07/13/17 1520    Specimen:  Blood Updated:  07/13/17 1616    Narrative:       The following orders were created for panel order CBC & Differential.  Procedure                               Abnormality         Status                     ---------                               -----------         ------                     Manual Differential[[id number]]          Abnormal            Final result               Scan Slide[[id number]]                                       Final result               CBC Auto Differential[[id number]]        Abnormal            Final result                 Please  view results for these tests on the individual orders.    CBC Auto Differential [[id number]]  (Abnormal) Collected:  07/13/17 1520    Specimen:  Blood Updated:  07/13/17 1616     WBC 29.37 (H) 10*3/mm3      RBC 3.36 (L) 10*6/mm3      Hemoglobin 10.9 (L) g/dL      Hematocrit 32.3 (L) %      MCV 96.1 (H) fL      MCH 32.4 pg      MCHC 33.7 g/dL      RDW 15.9 (H) %      RDW-SD 52.1 fl      MPV 13.2 (H) fL      Platelets 50 (L) 10*3/mm3     Scan Slide [[id number]] Collected:  07/13/17 1520    Specimen:  Blood Updated:  07/13/17 1616     Scan Slide --      See Manual Differential Results       Manual Differential [[id number]]  (Abnormal) Collected:  07/13/17 1520    Specimen:  Blood Updated:  07/13/17 1616     Neutrophil % 38.0 (L) %      Lymphocyte % 13.0 (L) %      Monocyte % 46.0 (H) %      Metamyelocyte % 1.0 (H) %      Myelocyte % 2.0 (H) %      Neutrophils Absolute 11.16 (H) 10*3/mm3      Lymphocytes Absolute 3.82 (H) 10*3/mm3      Monocytes Absolute 13.51 (H) 10*3/mm3      Anisocytosis Slight/1+     Platelet Estimate Decreased    POC Glucose Fingerstick [[id number]]  (Normal) Collected:  07/13/17 1630    Specimen:  Blood Updated:  07/13/17 1639     Glucose 103 mg/dL     Narrative:       Meter: [id number] : [id number] [facility]    Blood Gas, Arterial [[id number]]  (Abnormal) Collected:  07/13/17 2009    Specimen:  Arterial Blood Updated:  07/13/17 2019     Site Arterial: left radial     Slava's Test Positive     pH, Arterial 7.444 pH units      pCO2, Arterial 27.2 (C) mm Hg      pO2, Arterial 107.4 (H) mm Hg      HCO3, Arterial 18.2 (C) mmol/L      Base Excess, Arterial -4.6 mmol/L      O2 Saturation, Arterial 98.5 %      Hemoglobin, Blood Gas 11.6 (L) g/dL      Hematocrit, Blood Gas 34.0 (L) %      Oxyhemoglobin 95.9 %      Methemoglobin 0.4 %      Carboxyhemoglobin 2.2 %      A-a Gradiant 169.5 mmHg      Temperature 98.6 C      Barometric Pressure for Blood Gas 731 mmHg      Modality BiPAP     FIO2 45 %       Set [facility] Resp Rate 16     IPAP 14     EPAP 7    POC Glucose Fingerstick [[id number]]  (Normal) Collected:  07/13/17 2120    Specimen:  Blood Updated:  07/13/17 2224     Glucose 99 mg/dL     Narrative:       Meter: [id number] : [id number] chino bird    Blood Gas, Arterial With Co-Ox [[id number]]  (Abnormal) Collected:  07/14/17 0029    Specimen:  Arterial Blood Updated:  07/14/17 0035     Site Arterial: left radial     Slava's Test N/A     pH, Arterial 7.394 pH units      pCO2, Arterial 32.2 (L) mm Hg      pO2, Arterial 97.8 mm Hg      HCO3, Arterial 19.2 (C) mmol/L      Base Excess, Arterial -4.8 mmol/L      O2 Saturation, Arterial 97.5 %      Hemoglobin, Blood Gas 11.2 (L) g/dL      Hematocrit, Blood Gas 33.0 (L) %      Oxyhemoglobin 96.0 %      Methemoglobin 0.2 %      Carboxyhemoglobin 1.3 %      A-a Gradiant 173.4 mmHg      Temperature 98.6 C      Barometric Pressure for Blood Gas 731 mmHg      Modality Ventilator     FIO2 45 %      Ventilator Mode VC/AC     Set Tidal Volume 450     Set [facility] Resp Rate 14     PEEP 5    CBC & Differential [[id number]] Collected:  07/14/17 0152    Specimen:  Blood Updated:  07/14/17 0241    Narrative:       The following orders were created for panel order CBC & Differential.  Procedure                               Abnormality         Status                     ---------                               -----------         ------                     Manual Differential[[id number]]          Abnormal            Final result               Scan Slide[[id number]]                                       Final result               CBC Auto Differential[[id number]]        Abnormal            Final result                 Please view results for these tests on the individual orders.    CBC Auto Differential [[id number]]  (Abnormal) Collected:  07/14/17 0152    Specimen:  Blood Updated:  07/14/17 0241     WBC 39.91 (C) 10*3/mm3      RBC 3.09 (L) 10*6/mm3      Hemoglobin 10.1 (L) g/dL       Hematocrit 30.5 (L) %      MCV 98.7 (H) fL      MCH 32.7 pg      MCHC 33.1 g/dL      RDW 16.1 (H) %      RDW-SD 53.8 fl      MPV 13.1 (H) fL      Platelets 57 (L) 10*3/mm3     Scan Slide [[id number]] Collected:  07/14/17 0152    Specimen:  Blood Updated:  07/14/17 0241     Scan Slide --      See Manual Differential Results       Manual Differential [[id number]]  (Abnormal) Collected:  07/14/17 0152    Specimen:  Blood Updated:  07/14/17 0241     Neutrophil % 35.0 (L) %      Lymphocyte % 9.0 (L) %      Monocyte % 51.0 (H) %       Atypical monocytes present, see previous path review        Bands %  3.0 %      Myelocyte % 2.0 (H) %      Neutrophils Absolute 15.17 (H) 10*3/mm3      Lymphocytes Absolute 3.59 (H) 10*3/mm3      Monocytes Absolute 20.35 (H) 10*3/mm3      nRBC 1.0 (H) /100 WBC      Anisocytosis Slight/1+     Macrocytes Slight/1+     Platelet Estimate Decreased    Comprehensive Metabolic Panel [[id number]]  (Abnormal) Collected:  07/14/17 0152    Specimen:  Blood Updated:  07/14/17 0253     Glucose 109 mg/dL      BUN 26 (H) mg/dL      Creatinine 1.93 (H) mg/dL      Sodium 138 mmol/L      Potassium 3.7 mmol/L      Chloride 107 mmol/L      CO2 22.5 (L) mmol/L      Calcium 8.5 mg/dL      Total Protein 6.0 g/dL      Albumin 3.30 (L) g/dL      ALT (SGPT) 14 U/L      AST (SGOT) 37 (H) U/L      Alkaline Phosphatase 106 U/L       Note New Reference Ranges        Total Bilirubin 2.2 (H) mg/dL       1+ Icteric         eGFR Non African Amer 34 (L) mL/min/1.73      Globulin 2.7 gm/dL      A/G Ratio 1.2 (L) g/dL      BUN/Creatinine Ratio 13.5     Anion Gap 8.5 mmol/L     Narrative:       The MDRD GFR formula is only valid for adults with stable renal function between ages 18 and 70.    Osmolality, Calculated [[id number]]  (Normal) Collected:  07/14/17 0152    Specimen:  Blood Updated:  07/14/17 0253     Osmolality Calc 281.0 mOsm/kg     Blood Gas, Arterial [[id number]]  (Abnormal) Collected:  07/14/17 0440    Specimen:   Arterial Blood Updated:  07/14/17 0458     Site Arterial: right radial     Slava's Test N/A     pH, Arterial 7.345 (L) pH units      pCO2, Arterial 33.0 (L) mm Hg      pO2, Arterial 78.2 (L) mm Hg      HCO3, Arterial 17.6 (C) mmol/L      Base Excess, Arterial -7.1 mmol/L      O2 Saturation, Arterial 93.9 %      Hemoglobin, Blood Gas 12.2 g/dL      Hematocrit, Blood Gas 36.0 (L) %      Oxyhemoglobin 92.0 %      Methemoglobin 0.3 %      Carboxyhemoglobin 1.7 %      A-a Gradiant 192.1 mmHg      Temperature 98.6 C      Barometric Pressure for Blood Gas 731 mmHg      Modality Adult Vent     FIO2 45 %      Ventilator Mode AC/VC     Set Tidal Volume 450     Set Mech Resp Rate 14     PEEP 5    POC Glucose Fingerstick [[id number]]  (Normal) Collected:  07/14/17 0636    Specimen:  Blood Updated:  07/14/17 0643     Glucose 95 mg/dL     Narrative:       Meter: [id number] : [id number] chino pelon    Phosphorus [[id number]]  (Abnormal) Collected:  07/14/17 0752    Specimen:  Blood Updated:  07/14/17 0827     Phosphorus 4.7 (H) mg/dL     BNP [[id number]]  (Abnormal) Collected:  07/14/17 0743    Specimen:  Blood Updated:  07/14/17 0837     .0 (H) pg/mL     C-reactive Protein [[id number]]  (Abnormal) Collected:  07/14/17 0743    Specimen:  Blood Updated:  07/14/17 0846     C-Reactive Protein 16.20 (H) mg/dL       1+ Icteric        Sedimentation Rate [[id number]]  (Normal) Collected:  07/14/17 0743    Specimen:  Blood Updated:  07/14/17 0850     Sed Rate 19 mm/hr     D-dimer, Quantitative [[id number]]  (Abnormal) Collected:  07/14/17 0743    Specimen:  Blood Updated:  07/14/17 0851     D-Dimer, Quantitative 7.15 (C) MCGFEU/mL     Narrative:       Note new Reference Ranges    Magnesium [[id number]]  (Abnormal) Collected:  07/14/17 0752    Specimen:  Blood Updated:  07/14/17 0901     Magnesium 1.4 (C) mg/dL     Urine Culture [[id number]]  (Normal) Collected:  07/12/17 0817    Specimen:  Urine from Urine, Clean Catch Updated:   07/14/17 0949     Urine Culture No growth    POC Glucose Fingerstick [[id number]]  (Normal) Collected:  07/14/17 1131    Specimen:  Blood Updated:  07/14/17 1157     Glucose 91 mg/dL     Narrative:       Meter: [id number] : [id number] Constantin Wharton    aPTT [[id number]]  (Abnormal) Collected:  07/14/17 1158    Specimen:  Blood Updated:  07/14/17 1227     PTT 59.2 (H) seconds     Narrative:       Note new Reference Ranges    Protime-INR [[id number]]  (Abnormal) Collected:  07/14/17 1158    Specimen:  Blood Updated:  07/14/17 1227     Protime 18.9 (H) Seconds      INR 1.56 (H)    Narrative:       Note new Reference Ranges    Fibrinogen [[id number]]  (Normal) Collected:  07/14/17 1158    Specimen:  Blood Updated:  07/14/17 1227     Fibrinogen 451 mg/dL     Narrative:       Note new Reference Ranges    Uric Acid [[id number]]  (Normal) Collected:  07/14/17 1158    Specimen:  Blood Updated:  07/14/17 1232     Uric Acid 6.0 mg/dL     Lactate Dehydrogenase [[id number]]  (Abnormal) Collected:  07/14/17 1158    Specimen:  Blood Updated:  07/14/17 1233      (H) U/L       1+ Icteric        Erythropoietin [[id number]]  (Abnormal) Collected:  07/13/17 0356    Specimen:  Blood Updated:  07/14/17 1413     Erythropoietin 33.2 (H) mIU/mL     Narrative:       Performed at:  [address] [place] [address]  [id number]  : Daniel Aranda PhD, Phone:  [phone]          Condition on Discharge:   guarded  Vital Signs       Physical Exam:      General Appearance:  Sedated on vent.   Head:  Normocephalic, without obvious abnormality, atraumatic   Eyes:      Lids and lashes normal, conjunctivae and sclerae normal, no icterus, no pallor, corneas clear, PERRLA   Ears:  Ears appear intact with no abnormalities noted   Throat: No oral lesions, no thrush, oral mucosa moist   Neck: No adenopathy, supple, trachea midline, no thyromegaly, no carotid bruit, no JVD.ET/ NG   Back:  No kyphosis present, no scoliosis  present, no skin lesions, erythema or scars, no tenderness to percussion or palpation, range of motion normal   Lungs:  Decreased breath sounds and rales at bases bilaterally   Heart:  Regular rhythm and normal rate, normal S1 and S2, no murmur, no gallop, no rub, no click   Chest Wall:  No abnormalities observed   Abdomen:  Normal bowel sounds, no masses, no organomegaly, soft non-tender, non-distended, no guarding, no rebound tenderness   Rectal:  Deferred   Extremities: Moves all extremities well, no edema, no cyanosis, no redness   Pulses: Pulses palpable and equal bilaterally   Skin: No bleeding, bruising or rash   Lymph nodes: No palpable adenopathy   Neurologic: Cranial nerves 2 - 12 grossly intact, sensation intact, DTR present and equal bilaterally          Discharge Disposition  [facility] Term [facility] (DC - External)    Discharge Medications   Vitaly Pineda   Home Medication Instructions MAGGY:[id number]    Printed on:08/02/17 1983   Medication Information                      acetaminophen (TYLENOL) 325 MG tablet  Take 2 tablets by mouth Every 6 (Six) Hours As Needed for Mild Pain (1-3).             atorvastatin (LIPITOR) 10 MG tablet  Take 10 mg by mouth Daily.             carvedilol (COREG) 12.5 MG tablet  Take 1 tablet by mouth Every 12 (Twelve) Hours.             famotidine (PEPCID) 20 MG tablet  Take 1 tablet by mouth Daily.             FentaNYL Citrate (FENTANYL PCA 1500 MCG/30 ML, [facility],)  Nurse Loading Dose: 0 mcg  Patient Bolus Dose: 0 mcg  Lockout Interval: 15 Minutes  Basal Rate: 50 mcg/hr  One Hour Dose Limit: 300 mcg             FLUoxetine (PROzac) 20 MG capsule  Take 20 mg by mouth Daily.             haloperidol lactate (HALDOL) 5 MG/ML injection  Inject 0.5 mL into the shoulder, thigh, or buttocks Every 6 (Six) Hours As Needed for Agitation.             insulin aspart (novoLOG) 100 UNIT/ML injection  Inject 0-7 Units under the skin 4 (Four) Times a Day Before Meals & at Bedtime.              ipratropium-albuterol (DUO-NEB) 0.5-2.5 mg/mL nebulizer  Take 3 mL by nebulization Every 4 (Four) Hours As Needed for Wheezing.             ondansetron (ZOFRAN) 4 MG/2ML injection  Infuse 2 mL into a venous catheter Every 6 (Six) Hours As Needed for Nausea or Vomiting.             piperacillin-tazobactam (ZOSYN) 3.375 g/100 mL 0.9% NS IVPB (mbp)  Infuse 100 mL into a venous catheter Every 6 (Six) Hours. Indications: Infection of Blood or Tissues affecting the Whole Body             propofol (DIPRIVAN) 1000 mg/mL emulsion infusion  Infuse 431-4,310 mcg/min into a venous catheter Dose Adjusted By Provider As Needed.             vancomycin 1,250 mg in sodium chloride 0.9 % 250 mL IVPB  Infuse 1,250 mg into a venous catheter Daily. Indications: Pneumonia                 Discharge Diet:     Activity at Discharge:     Follow-up Appointments  No future appointments.      Test Results Pending at Discharge   Order Current Status    Leukemia / Lymphoma Eval In process    AFB Culture Preliminary result    AFB Culture Preliminary result    AFB Culture Preliminary result    Fungus Culture Preliminary result    Fungus Culture Preliminary result    Fungus Culture Preliminary result           Omkar aGry MD  08/02/17  8:42 AM    Time: Discharge > 30 min  Was spent in discharge planning,preparation, dictation,and instructions at the time of discharge.      Lab Results   Component Value Date    WBC 39.91 (C) 07/14/2017    HGB 10.1 (L) 07/14/2017    HCT 30.5 (L) 07/14/2017    MCV 98.7 (H) 07/14/2017    PLT 57 (L) 07/14/2017

## 2017-08-12 LAB
BACTERIA SPEC AEROBE CULT: NORMAL
FUNGUS SPEC CULT: NORMAL
FUNGUS SPEC FUNGUS STN: NORMAL

## 2017-08-24 LAB
ACID FAST STN SPEC: NEGATIVE
BACTERIA SPEC AEROBE CULT: NEGATIVE
SPECIMEN PREPARATION: NORMAL

## 2021-02-17 DIAGNOSIS — Z23 IMMUNIZATION DUE: ICD-10-CM
